# Patient Record
Sex: FEMALE | Race: WHITE | Employment: OTHER | URBAN - NONMETROPOLITAN AREA
[De-identification: names, ages, dates, MRNs, and addresses within clinical notes are randomized per-mention and may not be internally consistent; named-entity substitution may affect disease eponyms.]

---

## 2018-10-17 ENCOUNTER — HOSPITAL ENCOUNTER (OUTPATIENT)
Age: 73
Discharge: HOME | End: 2018-10-17
Payer: MEDICARE

## 2018-10-17 LAB
C-REACTIVE PROTEIN: 0.4 MG/L (ref 0–5)
HCT VFR BLD CALC: 43.1 % (ref 36.3–47.1)
HEMOGLOBIN: 13.9 G/DL (ref 11.9–15.1)
MCH RBC QN AUTO: 29 PG (ref 25.2–33.5)
MCHC RBC AUTO-ENTMCNC: 32.3 G/DL (ref 28.4–34.8)
MCV RBC AUTO: 90 FL (ref 82.6–102.9)
NRBC AUTOMATED: 0 PER 100 WBC
PDW BLD-RTO: 12.1 % (ref 11.8–14.4)
PLATELET # BLD: 272 K/UL (ref 138–453)
PMV BLD AUTO: 9.9 FL (ref 8.1–13.5)
RBC # BLD: 4.79 M/UL (ref 3.95–5.11)
SEDIMENTATION RATE, ERYTHROCYTE: 14 MM (ref 0–20)
WBC # BLD: 9.2 K/UL (ref 3.5–11.3)

## 2018-10-17 PROCEDURE — 85651 RBC SED RATE NONAUTOMATED: CPT

## 2018-10-17 PROCEDURE — 85027 COMPLETE CBC AUTOMATED: CPT

## 2018-10-17 PROCEDURE — 86140 C-REACTIVE PROTEIN: CPT

## 2018-10-17 PROCEDURE — 36415 COLL VENOUS BLD VENIPUNCTURE: CPT

## 2019-05-16 ENCOUNTER — HOSPITAL ENCOUNTER (OUTPATIENT)
Dept: PHYSICAL THERAPY | Age: 74
Setting detail: THERAPIES SERIES
Discharge: HOME | End: 2019-05-16
Payer: MEDICARE

## 2019-05-16 PROCEDURE — 97110 THERAPEUTIC EXERCISES: CPT

## 2019-05-16 PROCEDURE — 97162 PT EVAL MOD COMPLEX 30 MIN: CPT

## 2019-05-16 NOTE — PROGRESS NOTES
Phone: [address]          Fax: [phone]                      Outpatient Physical Therapy                                                                      Evaluation  Date: 2019  Patient: Zulma Templeton  : 1945  [facility] #: [id number]  Referring Practitioner: Dr. Flora Allen DO    Referral Date : 19     Diagnosis: R knee tendinitis    Treatment Diagnosis: Difficulty walking; R knee pain and arthritis; radicular symptoms  Onset Date: 14  PT Insurance Information: Medicare/United healthcare  Total # of Visits Approved: 12   Total # of Visits to Date: 1  No Show: 0  Canceled Appointment: 0     Subjective  Subjective: Pt. reports her last cortisone shot in the R knee was 1 week ago and did not help at all with her knee; she has arthritis in the R knee that was seen on xray about a year ago. Pt. reports she has never had pain in her knee before. Pt. reports tailbone pain and L hip pain as well but not as bad as the knee. Pt. reports R knee pain radiates up into her groin on the front of the thigh. Pt. reports she has difficulty planting flowers and has to sit on a pillow. Pt. reports R knee pain is 9/10 and 4/10 with sitting. Pt. reports Tylenol and ice and heat packs helps a little with the pain but not much. Pt. describes the pain as sudden, sharp and shooting in the L hip and R knee at the same time and has muscle cramping as well.    Additional Pertinent Hx: HTN, CAD, diabetes, anxiety, depression, arthritis, vision problems; lumbar herniated disc, xray one year ago revealed R knee arthritis, cortisone shots R knee             Objective     Observation/Palpation  Posture: Fair  Palpation: Pt. is TTP R anterior knee and thigh and L posterior hip and L PSIS  Observation: Pt. stands with increased lumbar lordosis and decreased thoracic kyphosis with decreased WB'ing on R LE; Pt. amb with decreased WB'ing on R LE due to pain    RLE General PROM: Hip IR: 10*  RLE General AROM: Knee: 0-124*  LLE General PROM: Hip IR: 10*  LLE General AROM: Knee: 0-119* with pain    Spine  Lumbar: flexion: able to reach knee joints; B SB: 6 inches from knee joint lines with pain           Strength RLE  Comment: hip flex/abd: 4-/5; knee ext/flex: 3+/5, ankle DF: 3/5  Strength LLE  Comment: hip flex/abd: 4-/5; knee ext/flex: 4-/5, ankle DF: 4-/5  Strength Other  Other: Abdominal strength: 3+/5    Additional Measures  Special Tests: (+) R slump; relief of B LE radicular pain with lumbar traction over therapy ball in supine    Functional Outcome Measures              Any of your usual work, housework, or school activities: Moderate Difficulty  Your usual hobbies, recreational, or sporting activities: Quite a Bit of Difficulty  Getting into or out of the bath: Quite a Bit of Difficulty  Walking between rooms: A Little Bit of Difficulty  Putting on your shoes or socks: A Little Bit of Difficulty  Squatting: Extreme Difficulty or Unable to Perform Activity  Lifting an object, like a bag of groceries from the floor: Quite a Bit of Difficulty  Performing light activities around your home: Moderate Difficulty  Performing heavy activities around your home: Quite a Bit of Difficulty  Getting into or out of a car:  A Little Bit of Difficulty  Walking 2 blocks: Quite a Bit of Difficulty  Walking a mile: Extreme Difficulty or Unable to Perform Activity  Going up or down 10 stairs (about 1 flight of stairs): Quite a Bit of Difficulty  Standing for 1 hour: Extreme Difficulty or Unable to Perform Activity  Sitting for 1 hour: Moderate Difficulty  Running on even ground : Extreme Difficulty or Unable to Perform Activity  Running on uneven ground : Extreme Difficulty or Unable to Perform Activity  Making sharp turns while running fast : Extreme Difficulty or Unable to Perform Activity  Hopping: Extreme Difficulty or Unable to Perform Activity  Rolling over in bed: Moderate Difficulty  LEFS Total Score: 23      Assessment  Assessment: Pt. is 68year old female with dx of R knee tendonitis who presents with increased pain 9/10 at worst that is sudden, sharp and shooting with standing and walking and is also present in her L hip and tailbone. Pain at rest in R knee is 4/10 on average and pain medication and ice/heat packs do not help with the pain. Pt. stands with increased lumbar lordosis and decreased thoracic kyphosis with decreased [address] on R LE; Pt. amb with decreased [address] on R LE due to pain. Pt. is TTP R anterior knee and thigh and L posterior hip and L PSIS. Pt. has decreased R knee AROM: 0-119* with pain. Pt. has decreased lumbar AROM: flexion: able to reach knee joints; B SB: 6 inches from knee joint lines with pain. Pt. has decreased strength in B LE, R worse than L. R LE strength: hip flex/abd: 4-/5; knee ext/flex: 3+/5, ankle DF: 3/5. (+) R slump; relief of B LE radicular pain with lumbar traction over therapy ball in supine. Pt. to benefit from aquatic therapy to promote offloading of joints to decrease pain with standing and walking and improve B LE and core strength to improve functional mobility and QOL. Prognosis: Good        Decision Making: Medium Complexity    Patient Education  Patient Education: PT eval, POC, HEP  Pt verbalized/demonstrated good understanding:     [X] Yes         [] No, pt required further clarification. Goals  Short term goals  Time Frame for Short term goals: 3 weeks  Short term goal 1: Pt. to initiate HEP to decrease pain and improve ROM and abdominal strength. Short term goal 2: Pt. to intiate aquatic therapy to strengthen B LE's and decreased R knee pain and radicular symptoms  Short term goal 3: Pt. to report decrease in pain with walking and standing to </=6/10 for improved QOL. Long term goals  Time Frame for Long term goals : 6 weeks  Long term goal 1: Pt. to be independent with aquatic exercise program and HEP.   Long term goal 2: Pt. to demo

## 2019-05-16 NOTE — PLAN OF CARE
[facility]           Phone: [phone]             Outpatient Physical Therapy  Fax: [phone]                                           Date: 2019  Patient: Mony Stuart : 1945 CSN #: [id number]   Referring Practitioner:  Dr. Lauro Bess DO Referral Date:  19       [] Plan of Care   [] Updated Plan of Care    Dates of Service to Include: 2019 to 19    Diagnosis:  R knee tendinitis    Rehab (Treatment) Diagnosis:  Difficulty walking; R knee pain and arthritis; radicular symptoms             Onset Date:  14    Attendance  Total # of Visits to Date: 1 No Show: 0 Canceled Appointment: 0    Assessment  Assessment: Pt. is 68year old female with dx of R knee tendonitis who presents with increased pain 9/10 at worst that is sudden, sharp and shooting with standing and walking and is also present in her L hip and tailbone. Pain at rest in R knee is 4/10 on average and pain medication and ice/heat packs do not help with the pain. Pt. stands with increased lumbar lordosis and decreased thoracic kyphosis with decreased [address] on R LE; Pt. amb with decreased [address] on R LE due to pain. Pt. is TTP R anterior knee and thigh and L posterior hip and L PSIS. Pt. has decreased R knee AROM: 0-119* with pain. Pt. has decreased lumbar AROM: flexion: able to reach knee joints; B SB: 6 inches from knee joint lines with pain. Pt. has decreased strength in B LE, R worse than L. R LE strength: hip flex/abd: 4-/5; knee ext/flex: 3+/5, ankle DF: 3/5. (+) R slump; relief of B LE radicular pain with lumbar traction over therapy ball in supine. Pt. to benefit from aquatic therapy to promote offloading of joints to decrease pain with standing and walking and improve B LE and core strength to improve functional mobility and QOL.        Goals  Short term goals  Time Frame for Short term goals: 3 weeks  Short term goal 1: Pt. to initiate HEP to decrease pain and improve ROM and abdominal strength. Short term goal 2: Pt. to intiate aquatic therapy to strengthen B LE's and decreased R knee pain and radicular symptoms  Short term goal 3: Pt. to report decrease in pain with walking and standing to </=6/10 for improved QOL. Long term goals  Time Frame for Long term goals : 6 weeks  Long term goal 1: Pt. to be independent with aquatic exercise program and HEP. Long term goal 2: Pt. to demo improved core strength >/=4/5 and B LE strength to >/=4/5 at all major joints grossly per MMT to improve functional strength. Long term goal 3: Pt. to have improved R knee AROM 0-125* with no pain and have improved lumbar AROM flexion: 4inches from toes to improve functional mobility. Long term goal 4: Pt. to report no radicular symptoms and decreased R knee and L hip pain to </=3/10 for improved QOL.       Prognosis  Prognosis: Good    Treatment Plan   Times per week: 3  Plan weeks: 4-6  [x] HP/CP      [] Electrical Stim   [x] Therapeutic Exercise      [] Gait Training  [x] Aquatics   [] Ultrasound         [x] Patient Education/HEP   [x] Manual Therapy  [] Traction    [x] Neuro-laci        [x] Soft Tissue Mobs            [] Home TENS  [] Iontophoresis    [] Orthotic casting/fitting      [] Dry Needling             Electronically signed by: Easton Fletcher PT, DPT    Date: 5/16/2019      ______________________________________ Date: 5/16/2019   Physician Signature

## 2019-05-17 ENCOUNTER — HOSPITAL ENCOUNTER (OUTPATIENT)
Dept: PHYSICAL THERAPY | Age: 74
Setting detail: THERAPIES SERIES
Discharge: HOME | End: 2019-05-17
Payer: MEDICARE

## 2019-05-17 PROCEDURE — 97113 AQUATIC THERAPY/EXERCISES: CPT

## 2019-05-17 NOTE — PROGRESS NOTES
Phone: Darius           Fax: [phone]                           Outpatient Physical Therapy                                                                            Daily Note    Patient: Rodrigo Michel : 1945  CSN #: [id number]   Referring Practitioner:  Dr. Patricia Cunha DO    Referral Date : 19     Date: 2019    Diagnosis: R knee tendinitis  Treatment Diagnosis: Difficulty walking; R knee pain and arthritis; radicular symptoms    Onset Date: 14  PT Insurance Information: Medicare/United healthcare  Total # of Visits Approved: 12 Per Physician Order  Total # of Visits to Date: 2  No Show: 0  Canceled Appointment: 0      Pre-Treatment Pain:  9/10  Subjective: Pt reports 9/10 R knee pain upon arrival.  Pt expressed excitement of beginning aquatic therapy. Exercises:  Exercise 1: HEP: LTR, SKTC, DKTC, supine PPT's, bridges  Exercise 2: FWD, Retro and lateral walking x2 laps each. Exercise 3: Sink/bench exercises, FWD/LSU/SD x10 ea - Deeper water  Exercise 4: Ant lunges with pink board , side lunges, blue foam push down x10 ea  Exercise 5: Step stretch and wall slide 5 x 10\" hold ea  Exercise 6: Biking/hanging in well x5 min ea      Assessment  Assessment: Pt having fair tolerance to initiation of aquatic exercise program with no c/o of increased pain. Core exercises also added for improved functional mobility- per pt goals. Lumbar flexion AROM = 6 in from toes today. Patient Education  Patient Education: Aquatic exercise program  Pt verbalized/demonstrated good understanding:     [x] Yes         [] No, pt required further clarification. Post Treatment Pain:  6/10      Plan  Times per week: 3  Plan weeks: 4-6      Goals  (Total # of Visits to Date: 2)   Short Term Goals - Time Frame for Short term goals: 3 weeks     Short term goal 1: Pt. to initiate HEP to decrease pain and improve ROM and abdominal strength. -MET [x]Met   []Partially met  []Not met   Short term goal 2: Pt. to intiate aquatic therapy to strengthen B LE's and decreased R knee pain and radicular symptoms-MET  [x]Met   []Partially met  []Not met   Short term goal 3: Pt. to report decrease in pain with walking and standing to </=6/10 for improved QOL. []Met   []Partially met  [x]Not met      []Met   []Partially met  []Not met     Long Term Goals - Time Frame for Long term goals : 6 weeks  Long term goal 1: Pt. to be independent with aquatic exercise program and HEP. []Met  []Partially met  [x]Not met   Long term goal 2: Pt. to demo improved core strength >/=4/5 and B LE strength to >/=4/5 at all major joints grossly per MMT to improve functional strength. []Met  []Partially met  [x]Not met   Long term goal 3: Pt. to have improved R knee AROM 0-125* with no pain and have improved lumbar AROM flexion: 4inches from toes to improve functional mobility. []Met  []Partially met  [x]Not met   Long term goal 4: Pt. to report no radicular symptoms and decreased R knee and L hip pain to </=3/10 for improved QOL.   []Met  []Partially met  [x]Not met     []Met  []Partially met  []Not met       Minutes Tracking:  Time In: 0912  Time Out: 1000  Minutes: Lucas Tadeo PTA     Date: 5/17/2019

## 2019-05-20 ENCOUNTER — HOSPITAL ENCOUNTER (OUTPATIENT)
Dept: PHYSICAL THERAPY | Age: 74
Setting detail: THERAPIES SERIES
Discharge: HOME | End: 2019-05-20
Payer: MEDICARE

## 2019-05-20 PROCEDURE — 97113 AQUATIC THERAPY/EXERCISES: CPT

## 2019-05-20 NOTE — PROGRESS NOTES
Phone: Darius           Fax: [phone]                           Outpatient Physical Therapy                                                                            Daily Note    Patient: Baron Dan : 1945  CSN #: [id number]   Referring Practitioner:  Dr. Clay Phoenix, DO    Referral Date : 19     Date: 2019    Diagnosis: R knee tendinitis  Treatment Diagnosis: Difficulty walking; R knee pain and arthritis; radicular symptoms    Onset Date: 14  PT Insurance Information: Medicare/United healthcare  Total # of Visits Approved: 12 Per Physician Order  Total # of Visits to Date: 3  No Show: 0  Canceled Appointment: 0      Pre-Treatment Pain:  9/10  Subjective: Pt reports 9/10 R knee pain upon arrival.  Pt reports doing a lot of walking this past weekend and had noted increased soreness today. Pt reports not doing exercises for two days d/t \"not having enough time\" but did them yesteday and \"could really feel the front of my thigh working. \"     Exercises:  Exercise 1: HEP: LTR, SKTC, DKTC, supine PPT's, bridges. Updated: seated heel/toe raises, marches and LAQ. Exercise 2: FWD, Retro and lateral walking x2 laps each. - deeper water today d/t increased pain. Exercise 3: Sink/bench exercises, FWD/LSU/SD x10 ea - Deeper water  Exercise 4: Ant lunges with pink board , side lunges, blue foam push down x10 ea  Exercise 5: Step stretch and wall slide 5 x 10\" hold ea  Exercise 6: Biking/hanging in well x5 min ea  Exercise 7: Jet massage x5 min       Assessment  Assessment: Pt completed water walking in deeper water today d/t reports of increased discomfort in shallow water. Pt with no reports of increased pain post tx. Pt arrived to tx with R quad soreness from HEP and \"having a busy weekend. \"  Updated HEP with added exercises of H/T raises, marches and LAQ to increase BLE strength per pt goals.   Pt used jet massage at end of tx d/t reports of low back discomfort. Patient Education  Patient Education: Updated HEP. Pt verbalized/demonstrated good understanding:     [x] Yes         [] No, pt required further clarification. Post Treatment Pain:  8/10      Plan  Times per week: 3  Plan weeks: 4-6      Goals  (Total # of Visits to Date: 3)   Short Term Goals - Time Frame for Short term goals: 3 weeks    Short term goal 1: Pt. to initiate HEP to decrease pain and improve ROM and abdominal strength. -MET                                        [x]Met   []Partially met  []Not met   Short term goal 2: Pt. to intiate aquatic therapy to strengthen B LE's and decreased R knee pain and radicular symptoms-MET  [x]Met   []Partially met  []Not met   Short term goal 3: Pt. to report decrease in pain with walking and standing to </=6/10 for improved QOL. []Met   []Partially met  [x]Not met      []Met   []Partially met  []Not met     Long Term Goals - Time Frame for Long term goals : 6 weeks  Long term goal 1: Pt. to be independent with aquatic exercise program and HEP. []Met  []Partially met  [x]Not met   Long term goal 2: Pt. to demo improved core strength >/=4/5 and B LE strength to >/=4/5 at all major joints grossly per MMT to improve functional strength. []Met  []Partially met  [x]Not met   Long term goal 3: Pt. to have improved R knee AROM 0-125* with no pain and have improved lumbar AROM flexion: 4inches from toes to improve functional mobility. []Met  []Partially met  [x]Not met   Long term goal 4: Pt. to report no radicular symptoms and decreased R knee and L hip pain to </=3/10 for improved QOL.   []Met  []Partially met  [x]Not met     []Met  []Partially met  []Not met       Minutes Tracking:  Time In: [facility]  [facility]  Minutes: [address] [facility], [facility]     Date: 5/20/2019

## 2019-05-22 ENCOUNTER — HOSPITAL ENCOUNTER (OUTPATIENT)
Dept: PHYSICAL THERAPY | Age: 74
Setting detail: THERAPIES SERIES
Discharge: HOME | End: 2019-05-22
Payer: MEDICARE

## 2019-05-22 PROCEDURE — 97113 AQUATIC THERAPY/EXERCISES: CPT

## 2019-05-22 NOTE — PROGRESS NOTES
Phone: [facility]           Fax: [phone]                           Outpatient Physical Therapy                                                                            Daily Note    Patient: Junie Hung : 1945  CSN #: [id number]   Referring Practitioner:  Dr. Kyra Rubio DO    Referral Date : 19     Date: 2019    Diagnosis: R knee tendinitis  Treatment Diagnosis: Difficulty walking; R knee pain and arthritis; radicular symptoms    Onset Date: 14  PT Insurance Information: Medicare/United healthcare  Total # of Visits Approved: 12 Per Physician Order  Total # of Visits to Date: 4  No Show: 0  Canceled Appointment: 0      Pre-Treatment Pain:  9/10  Subjective: Pt arrived to session rating pain at 9/10 in R knee. Pt states that she can feel increased tightness throughout R quad today that she believes is \"from the weather. \"    Exercises:  Exercise 1: HEP: LTR, SKTC, DKTC, supine PPT's, bridges. Updated: seated heel/toe raises, marches and LAQ. Exercise 2: Water walking x2 laps ea: Fwd/lateral/retro  Exercise 3: Sink ex/bench exercises 15x ea , FWD/LSU/SD x10 ea - Deeper water  Exercise 4: Ant lunges with pink board , side lunges, blue foam push down x10 ea  Exercise 5: Step stretch and wall slide 5x 10\" hold ea  Exercise 6: Biking/hanging in well x5 min ea  Exercise 7: Jet massage x5 min    Assessment  Assessment: Pt able to tolerate water walking in shallow end this date without complaints of increased pain. Pt overall showing good tolerance to aquatic therex program, jet massage and bench ex performed simultaneously to improve R knee strength and LB discomfort. Will continue to progress as tolerated. Patient Education  Pt educated on exercise rationale and HEP - good compliance noted. Pt verbalized/demonstrated good understanding:     [x] Yes         [] No, pt required further clarification.     Post Treatment Pain: 8/10      Plan  Times per week: 3  Plan weeks: 4-6      Goals  (Total # of Visits to Date: 4)   Short Term Goals - Time Frame for Short term goals: 3 weeks    Short term goal 1: Pt. to initiate HEP to decrease pain and improve ROM and abdominal strength. -MET                                        [x]Met  []Partially met  []Not met   Short term goal 2: Pt. to intiate aquatic therapy to strengthen B LE's and decreased R knee pain and radicular symptoms -MET  [x]Met  []Partially met  []Not met   Short term goal 3: Pt. to report decrease in pain with walking and standing to </=6/10 for improved QOL. []Met  []Partially met  []Not met      []Met  []Partially met  []Not met     Long Term Goals - Time Frame for Long term goals : 6 weeks  Long term goal 1: Pt. to be independent with aquatic exercise program and HEP. []Met  []Partially met  []Not met   Long term goal 2: Pt. to demo improved core strength >/=4/5 and B LE strength to >/=4/5 at all major joints grossly per MMT to improve functional strength. []Met  []Partially met  []Not met   Long term goal 3: Pt. to have improved R knee AROM 0-125* with no pain and have improved lumbar AROM flexion: 4inches from toes to improve functional mobility. []Met  []Partially met  []Not met   Long term goal 4: Pt. to report no radicular symptoms and decreased R knee and L hip pain to </=3/10 for improved QOL.   []Met  []Partially met  []Not met     []Met  []Partially met  []Not met       Minutes Tracking:  Time In: Tata Ortiz  Time Out: [address]  Minutes: [address] [facility], PTA       Date: 5/22/2019

## 2019-05-24 ENCOUNTER — HOSPITAL ENCOUNTER (OUTPATIENT)
Dept: PHYSICAL THERAPY | Age: 74
Setting detail: THERAPIES SERIES
Discharge: HOME | End: 2019-05-24
Payer: MEDICARE

## 2019-05-24 PROCEDURE — 97113 AQUATIC THERAPY/EXERCISES: CPT

## 2019-05-24 NOTE — PROGRESS NOTES
Phone: [facility]arius           Fax: [phone]                           Outpatient Physical Therapy                                                                            Daily Note    Patient: Cricket Sagastume : 1945  [facility] #: [id number]   Referring Practitioner:  Dr. Pascale Cope DO    Referral Date : 19     Date: 2019    Diagnosis: R knee tendinitis  Treatment Diagnosis: Difficulty walking; R knee pain and arthritis; radicular symptoms    Onset Date: 14  PT Insurance Information: Medicare/United healthcare  Total # of Visits Approved: 12 Per Physician Order  Total # of Visits to Date: 5  No Show: 0  Canceled Appointment: 0      Pre-Treatment Pain:  6/10  Subjective: Pt arrived to session rating R knee pain at a 10. Pt states she had '10/10' R knee pain on Wednesday after leaving her pool therapy session. Exercises:  Exercise 1: HEP: LTR, SKTC, DKTC, supine PPT's, bridges. Updated: seated heel/toe raises, marches and LAQ. Exercise 2: Water walking x2 laps ea: Fwd/lateral/retro  Exercise 3: Sink ex/bench exercises 15x ea , FWD/LSU/SD x10 ea - Deeper water  Exercise 4: Ant lunges with pink board , side lunges, blue foam push down x10 ea  Exercise 5: Step stretch and wall slide 5x 10\" hold ea  Exercise 6: Biking/hanging in well x5 min ea  Exercise 7: Jet massage x5 min- held today    Assessment  Assessment: Pt had no c/o increased pain throughout/post tx today. AROM lumbar flexion= 4 inches from toes meeting LTG #3.  R knee AROM = 0-106* today. Pt had reported increase in pain once reaching 106* of flexion. Pt reports compliance with updated HEP. Patient Education  Patient Education: Exercise rationale. Pt verbalized/demonstrated good understanding:     [x] Yes         [] No, pt required further clarification.     Post Treatment Pain:  6/10      Plan  Times per week: 3  Plan weeks: 4-6      Goals  (Total # of Visits to Date: 5)   Short Term Goals - Time Frame for Short term goals: 3 weeks    Short term goal 1: Pt. to initiate HEP to decrease pain and improve ROM and abdominal strength. -MET                                        [x]Met   []Partially met  []Not met   Short term goal 2: Pt. to intiate aquatic therapy to strengthen B LE's and decreased R knee pain and radicular symptoms -MET  [x]Met   []Partially met  []Not met   Short term goal 3: Pt. to report decrease in pain with walking and standing to </=6/10 for improved QOL. []Met   []Partially met  [x]Not met      []Met   []Partially met  []Not met     Long Term Goals - Time Frame for Long term goals : 6 weeks  Long term goal 1: Pt. to be independent with aquatic exercise program and HEP. [x]Met  []Partially met  []Not met   Long term goal 2: Pt. to demo improved core strength >/=4/5 and B LE strength to >/=4/5 at all major joints grossly per MMT to improve functional strength. []Met  []Partially met  [x]Not met   Long term goal 3: Pt. to have improved R knee AROM 0-125* with no pain and have improved lumbar AROM flexion: 4inches from toes to improve functional mobility. (Met lumbar AROM goal= 4in from toes as of 5/24/19) []Met  [x]Partially met  []Not met   Long term goal 4: Pt. to report no radicular symptoms and decreased R knee and L hip pain to </=3/10 for improved QOL.   []Met  []Partially met  [x]Not met     []Met  []Partially met  []Not met       Minutes Tracking:  Time In: 0827  Time Out: 0911  Minutes: [address], [facility]     Date: 5/24/2019

## 2019-05-29 ENCOUNTER — HOSPITAL ENCOUNTER (OUTPATIENT)
Dept: PHYSICAL THERAPY | Age: 74
Setting detail: THERAPIES SERIES
End: 2019-05-29
Payer: MEDICARE

## 2019-05-31 ENCOUNTER — HOSPITAL ENCOUNTER (OUTPATIENT)
Dept: PHYSICAL THERAPY | Age: 74
Setting detail: THERAPIES SERIES
Discharge: HOME | End: 2019-05-31
Payer: MEDICARE

## 2019-05-31 PROCEDURE — 97113 AQUATIC THERAPY/EXERCISES: CPT

## 2019-05-31 NOTE — PROGRESS NOTES
Phone: Darius           Fax: [phone]                           Outpatient Physical Therapy                                                                            Daily Note    Patient: Carly Page : 1945  [facility] #: [id number]   Referring Practitioner:  Dr. Emily Garcia DO    Referral Date : 19     Date: 2019    Diagnosis: R knee tendinitis  Treatment Diagnosis: Difficulty walking; R knee pain and arthritis; radicular symptoms    Onset Date: 14  PT Insurance Information: Medicare/United healthcare  Total # of Visits Approved: 12 Per Physician Order  Total # of Visits to Date: 6  No Show: 0  Canceled Appointment: 0      Pre-Treatment Pain:  10/10  Subjective: Today, pt reports R knee pain feels like a '10/10', with pt reporting she was 'up and down stairs a lot of times since Wednesday\". Pt reports she is getting ready for a garage sale this weekend. Pt reports she does take breaks and ices her knee as needed. Reports she does get swelling in knee. Exercises:  Exercise 1: HEP: LTR, SKTC, DKTC, supine PPT's, bridges. Updated: seated heel/toe raises, marches and LAQ. Exercise 2: Water walking x2 laps ea: Fwd/lateral/retro- deeper water today  Exercise 3: Sink ex/bench exercises 15x ea , FWD/LSU/SD x10 ea - Deeper water  Exercise 4: Ant lunges with pink board , side lunges, blue foam push down x10 ea  Exercise 5: Step stretch and wall slide 5x 10\" hold ea  Exercise 6: Biking/hanging in well x5 min ea  Exercise 7: Jet massage x5 min- held today      Assessment  Assessment: Pt completed most of tx in deeper today d/t reports of R knee pain at almost a '10/10'. Pt encouraged to take longer and frequent RBs when at home as well as applying a CP to help decrease pain/swelling. R knee ROM= 1-119* today. MMT:  Core= 4/5. BLE strength grossly 4-/5.       Patient Education  Patient Education: Importance of RBs and using ice for pain relief. Pt verbalized/demonstrated good understanding:     [x] Yes         [] No, pt required further clarification. Post Treatment Pain:  5/10      Plan  Times per week: 3  Plan weeks: 4-6      Goals  (Total # of Visits to Date: 6)   Short Term Goals - Time Frame for Short term goals: 3 weeks   Short term goal 1: Pt. to initiate HEP to decrease pain and improve ROM and abdominal strength. -MET                                        [x]Met   []Partially met  []Not met   Short term goal 2: Pt. to intiate aquatic therapy to strengthen B LE's and decreased R knee pain and radicular symptoms -MET  [x]Met   []Partially met  []Not met   Short term goal 3: Pt. to report decrease in pain with walking and standing to </=6/10 for improved QOL. []Met   []Partially met  []Not met      []Met   []Partially met  []Not met     Long Term Goals - Time Frame for Long term goals : 6 weeks  Long term goal 1: Pt. to be independent with aquatic exercise program and HEP. []Met  [x]Partially met  []Not met   Long term goal 2: Pt. to demo improved core strength >/=4/5 and B LE strength to >/=4/5 at all major joints grossly per MMT to improve functional strength. (Met core MMT 4/5 as of 5/31/19) []Met  [x]Partially met  []Not met   Long term goal 3: Pt. to have improved R knee AROM 0-125* with no pain and have improved lumbar AROM flexion: 4inches from toes to improve functional mobility. (Met lumbar AROM goal= 4in from toes as of 5/24/19) []Met  [x]Partially met  []Not met   Long term goal 4: Pt. to report no radicular symptoms and decreased R knee and L hip pain to </=3/10 for improved QOL.   []Met  []Partially met  [x]Not met     []Met  []Partially met  []Not met       Minutes Tracking:  Time In: 6024  Time Out: 0845  Minutes: [address] [facility], [facility]     Date: 5/31/2019

## 2019-06-03 ENCOUNTER — HOSPITAL ENCOUNTER (OUTPATIENT)
Dept: PHYSICAL THERAPY | Age: 74
Setting detail: THERAPIES SERIES
Discharge: HOME | End: 2019-06-03
Payer: MEDICARE

## 2019-06-03 PROCEDURE — 97113 AQUATIC THERAPY/EXERCISES: CPT

## 2019-06-03 NOTE — PROGRESS NOTES
Pain:  0/10      Plan  Times per week: 3  Plan weeks: 4-6      Goals  (Total # of Visits to Date: 7)   Short Term Goals - Time Frame for Short term goals: 3 weeks    Short term goal 1: Pt. to initiate HEP to decrease pain and improve ROM and abdominal strength. -MET                                        [x]Met   []Partially met  []Not met   Short term goal 2: Pt. to intiate aquatic therapy to strengthen B LE's and decreased R knee pain and radicular symptoms -MET  [x]Met   []Partially met  []Not met   Short term goal 3: Pt. to report decrease in pain with walking and standing to </=6/10 for improved QOL. []Met   [x]Partially met  []Not met      []Met   []Partially met  []Not met     Long Term Goals - Time Frame for Long term goals : 6 weeks  Long term goal 1: Pt. to be independent with aquatic exercise program and HEP. [x]Met  []Partially met  []Not met   Long term goal 2: Pt. to demo improved core strength >/=4/5 and B LE strength to >/=4/5 at all major joints grossly per MMT to improve functional strength. (Met core MMT 4/5 as of 5/31/19) [x]Met  []Partially met  []Not met   Long term goal 3: Pt. to have improved R knee AROM 0-125* with no pain and have improved lumbar AROM flexion: 4inches from toes to improve functional mobility. MET BOTH (0-126* as of 6/3/19) [x]Met  []Partially met  []Not met   Long term goal 4: Pt. to report no radicular symptoms and decreased R knee and L hip pain to </=3/10 for improved QOL.   []Met  [x]Partially met  []Not met     []Met  []Partially met  []Not met       Minutes Tracking:  Time In: 2402  Time Out: Velia 621  Minutes: Lucas Tadeo PTA     Date: 6/3/2019

## 2019-06-05 ENCOUNTER — HOSPITAL ENCOUNTER (OUTPATIENT)
Dept: PHYSICAL THERAPY | Age: 74
Setting detail: THERAPIES SERIES
Discharge: HOME | End: 2019-06-05
Payer: MEDICARE

## 2019-06-05 PROCEDURE — 97113 AQUATIC THERAPY/EXERCISES: CPT

## 2019-06-05 NOTE — PROGRESS NOTES
Phone: Darius           Fax: [phone]                           Outpatient Physical Therapy                                                                            Daily Note    Patient: Baron Dan : 1945  [facility] #: [id number]   Referring Practitioner:  Dr. Clay Phoenix, DO    Referral Date : 19     Date: 2019    Diagnosis: R knee tendinitis  Treatment Diagnosis: Difficulty walking; R knee pain and arthritis; radicular symptoms    Onset Date: 14  PT Insurance Information: Medicare/United healthcare  Total # of Visits Approved: 12 Per Physician Order  Total # of Visits to Date: 8  No Show: 0  Canceled Appointment: 0      Pre-Treatment Pain:  10/10  Subjective: Pt reports she slipped on steps at home and slid into wall yesterday morning () w/ pain rating at 10/10 in R knee today. Exercises:  Exercise 1: HEP: LTR, SKTC, DKTC, supine PPT's, bridges. Updated: seated heel/toe raises, marches and LAQ. Exercise 2: Water walking x2 laps ea: Fwd/lateral/retro  Exercise 3: Sink ex/bench exercises 15x ea , FWD/LSU/SD x10 ea  Exercise 4: Ant lunges with pink board , side lunges, blue foam push down x10 ea -HELD BLUE FLOAT PUSH DOWNS  Exercise 5: Step stretch and wall slide 10x 5\" hold ea  Exercise 6: Biking/hanging in well x5 min ea  Exercise 7: Jet massage x5 min    Assessment  Assessment: Certain exercises performed in deep water this date d/t increased R knee pain coming into session, pt having fair tolerance. Held blue float push downs to avoid increasing overall pain level. Will continue to progress as able. Patient Education  Pt educated on exercise rationale. Pt verbalized/demonstrated good understanding:     [x] Yes         [] No, pt required further clarification.     Post Treatment Pain:  9-10/10      Plan  Times per week: 3  Plan weeks: 4-6      Goals  (Total # of Visits to Date: 8)   Short Term Goals - Time Frame for Short term goals: 3 weeks    Short term goal 1: Pt. to initiate HEP to decrease pain and improve ROM and abdominal strength. -MET                                        [x]Met  []Partially met  []Not met   Short term goal 2: Pt. to intiate aquatic therapy to strengthen B LE's and decreased R knee pain and radicular symptoms -MET  [x]Met   []Partially met  []Not met   Short term goal 3: Pt. to report decrease in pain with walking and standing to </=6/10 for improved QOL. []Met  []Partially met  []Not met      []Met  []Partially met  []Not met     Long Term Goals - Time Frame for Long term goals : 6 weeks  Long term goal 1: Pt. to be independent with aquatic exercise program and HEP. -MET [x]Met  []Partially met  []Not met   Long term goal 2: Pt. to demo improved core strength >/=4/5 and B LE strength to >/=4/5 at all major joints grossly per MMT to improve functional strength. -PROGRESSING (core=4/5) []Met  [x]Partially met  []Not met   Long term goal 3: Pt. to have improved R knee AROM 0-125* with no pain and have improved lumbar AROM flexion: 4 inches from toes to improve functional mobility. -MET (0-126*) [x]Met  []Partially met  []Not met   Long term goal 4: Pt. to report no radicular symptoms and decreased R knee and L hip pain to </=3/10 for improved QOL.   []Met  []Partially met  []Not met     []Met  []Partially met  []Not met       Minutes Tracking:  Time In: 3210  Time Out: 0901  Minutes: [address] [facility] [address], PTA       Date: 6/5/2019

## 2019-06-07 ENCOUNTER — HOSPITAL ENCOUNTER (OUTPATIENT)
Dept: PHYSICAL THERAPY | Age: 74
Setting detail: THERAPIES SERIES
Discharge: HOME | End: 2019-06-07
Payer: MEDICARE

## 2019-06-07 PROCEDURE — 97113 AQUATIC THERAPY/EXERCISES: CPT

## 2019-06-07 ASSESSMENT — PAIN SCALES - GENERAL: PAINLEVEL_OUTOF10: 9

## 2019-06-07 ASSESSMENT — PAIN DESCRIPTION - DESCRIPTORS: DESCRIPTORS: THROBBING

## 2019-06-07 ASSESSMENT — PAIN DESCRIPTION - FREQUENCY: FREQUENCY: CONTINUOUS

## 2019-06-07 NOTE — PROGRESS NOTES
Phone: Darius           Fax: [phone]                           Outpatient Physical Therapy                                                                            Daily Note    Patient: Sunshine James : 1945  CSN #: [id number]   Referring Practitioner:  Dr. Karen Hogan DO    Referral Date : 19     Date: 2019    Diagnosis: R knee tendinitis  Treatment Diagnosis: Difficulty walking; R knee pain and arthritis; radicular symptoms    Onset Date: 14  PT Insurance Information: Medicare/United healthcare  Total # of Visits Approved: 12 Per Physician Order  Total # of Visits to Date: 9  No Show: 0  Canceled Appointment: 0      Pre-Treatment Pain:  9/10  Subjective: Pt arrived to tx rating R knee pain a /10. Reports she's still sore from slipping on steps 2 days ago. Pt reports she \"drug 8 tables out into the garage yesterday\" and is getting things around for a garage sale. Exercises:  Exercise 1: HEP: LTR, SKTC, DKTC, supine PPT's, bridges. Updated: seated heel/toe raises, marches and LAQ. Exercise 2: Water walking x2 laps ea: Fwd/lateral/retro  Exercise 3: Sink ex/bench exercises 15x ea , FWD/LSU/SD x10 ea  Exercise 4: Ant lunges with pink board , side lunges, blue foam push down x10 ea -HELD BLUE FLOAT PUSH DOWNS  Exercise 5: Step stretch and wall slide 10x 5\" hold ea  Exercise 6: Biking/hanging in well x5 min ea  Exercise 7: Jet massage x5 min-HELD (pt declined)    Assessment  Assessment: Pt reports pain is still increased today. Reports she's been doing heavy lifting/moving to prepare for garage sale. Pt educated on importance of taking frequent RBs, asking for help and icing knee to allow her knee to adequately heal. Pt reports a significant decrease in pain after tx. Patient Education  Patient Education: Importance of taking RBs/asking for help when at working at home.   Pt verbalized/demonstrated good understanding:     [x] Yes [] No, pt required further clarification. Post Treatment Pain:  2/10      Plan  Times per week: 3  Plan weeks: 4-6      Goals  (Total # of Visits to Date: 5)   Short Term Goals - Time Frame for Short term goals: 3 weeks     Short term goal 1: Pt. to initiate HEP to decrease pain and improve ROM and abdominal strength. -MET                                        []Met   []Partially met  []Not met   Short term goal 2: Pt. to intiate aquatic therapy to strengthen B LE's and decreased R knee pain and radicular symptoms -MET  []Met   []Partially met  []Not met   Short term goal 3: Pt. to report decrease in pain with walking and standing to </=6/10 for improved QOL. []Met   []Partially met  []Not met      []Met   []Partially met  []Not met     Long Term Goals - Time Frame for Long term goals : 6 weeks  Long term goal 1: Pt. to be independent with aquatic exercise program and HEP. -MET []Met  []Partially met  []Not met   Long term goal 2: Pt. to demo improved core strength >/=4/5 and B LE strength to >/=4/5 at all major joints grossly per MMT to improve functional strength. -PROGRESSING (core=4/5) []Met  []Partially met  []Not met   Long term goal 3: Pt. to have improved R knee AROM 0-125* with no pain and have improved lumbar AROM flexion: 4 inches from toes to improve functional mobility. -MET (0-126*) []Met  []Partially met  []Not met   Long term goal 4: Pt. to report no radicular symptoms and decreased R knee and L hip pain to </=3/10 for improved QOL.   []Met  []Partially met  []Not met     []Met  []Partially met  []Not met       Minutes Tracking:  Time In: 6473  Time Out: 0840  Minutes: Phyllis Castellano, PTA     Date: 6/7/2019

## 2019-06-10 ENCOUNTER — HOSPITAL ENCOUNTER (OUTPATIENT)
Dept: PHYSICAL THERAPY | Age: 74
Setting detail: THERAPIES SERIES
Discharge: HOME | End: 2019-06-10
Payer: MEDICARE

## 2019-06-10 PROCEDURE — 97113 AQUATIC THERAPY/EXERCISES: CPT

## 2019-06-10 NOTE — PROGRESS NOTES
Phone: Darius           Fax: [phone]                           Outpatient Physical Therapy                                                                            Daily Note    Patient: Cricket Sagastume : 1945  CSN #: [id number]   Referring Practitioner:  Dr. 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 Pt reports R knee pain is at a 9/10 at the moment. Pt states \"I just don't feel like therapy is really working anymore. \"  Pt reports 'the groin, under my knee cap and my upper thigh pain has went away but now it just hurts in my knee joint. \"      Exercises:  Exercise 1: HEP: LTR, SKTC, DKTC, supine PPT's, bridges. Updated: seated heel/toe raises, marches and LAQ. Exercise 2: Water walking x2 laps ea: Fwd/lateral/retro  Exercise 3: Sink ex/bench exercises 15x ea , FWD/LSU/SD x10 ea  Exercise 4: Ant lunges with pink board , side lunges, blue foam push down x10 ea -HELD BLUE FLOAT PUSH DOWNS  Exercise 5: Step stretch and wall slide 10x 5\" hold ea  Exercise 6: Biking/hanging in well x5 min ea  Exercise 7: Jet massage x5 min-HELD (pt declined)        Assessment  Assessment: Pt continues to c/o increased R knee pain, states \"My pain that I had when I first started seems like it's gone but now I think arthritis is getting to me. I think I'm going to need surgery on it like the  said. \"  Pt reports that she doesn't think therapy is helping much anymore and wants to buy a knee brace to attempt to alleviate pain. Will follow up with PT to discuss pt's POC. R knee AROM= 0-132*. Patient Education  Proper brace for knee  Pt verbalized/demonstrated good understanding:     [x] Yes         [] No, pt required further clarification. Post Treatment Pain:  9/10      Plan  Times per week: 3  Plan weeks: 4-6      Goals  (Total # of Visits to Date: 10)   Short Term Goals - Time Frame for Short term goals: 3 weeks     Short term goal 1: Pt. to initiate HEP to decrease pain and improve ROM and abdominal strength. -MET                                        []Met   []Partially met  []Not met   Short term goal 2: Pt. to intiate aquatic therapy to strengthen B LE's and decreased R knee pain and radicular symptoms -MET  []Met   []Partially met  []Not met   Short term goal 3: Pt. to report decrease in pain with walking and standing to </=6/10 for improved QOL. []Met   []Partially met  []Not met      []Met   []Partially met  []Not met     Long Term Goals - Time Frame for Long term goals : 6 weeks  Long term goal 1: Pt. to be independent with aquatic exercise program and HEP. -MET []Met  []Partially met  []Not met   Long term goal 2: Pt. to demo improved core strength >/=4/5 and B LE strength to >/=4/5 at all major joints grossly per MMT to improve functional strength. -PROGRESSING (core=4/5) []Met  []Partially met  []Not met   Long term goal 3: Pt. to have improved R knee AROM 0-125* with no pain and have improved lumbar AROM flexion: 4 inches from toes to improve functional mobility. -MET (0-132*) []Met  []Partially met  []Not met   Long term goal 4: Pt. to report no radicular symptoms and decreased R knee and L hip pain to </=3/10 for improved QOL.   []Met  []Partially met  []Not met     []Met  []Partially met  []Not met       Minutes Tracking:  Time In: 0800  Time Out: 0848  Minutes: Lucas Tadeo PTA     Date: 6/10/2019

## 2019-06-12 ENCOUNTER — HOSPITAL ENCOUNTER (OUTPATIENT)
Dept: PHYSICAL THERAPY | Age: 74
Setting detail: THERAPIES SERIES
Discharge: HOME | End: 2019-06-12
Payer: MEDICARE

## 2019-06-12 PROCEDURE — 97113 AQUATIC THERAPY/EXERCISES: CPT

## 2019-06-12 NOTE — PROGRESS NOTES
Phone: Darius           Fax: [phone]                           Outpatient Physical Therapy                                                                            Daily Note    Patient: Jessie Watts : 1945  CSN #: [id number]   Referring Practitioner:  Dr. Elizabeth Linton DO    Referral Date : 19     Date: 2019    Diagnosis: R knee tendinitis  Treatment Diagnosis: Difficulty walking; R knee pain and arthritis; radicular symptoms    Onset Date: 14  PT Insurance Information: Medicare/United healthcare  Total # of Visits Approved: 12 Per Physician Order  Total # of Visits to Date: 11  No Show: 0  Canceled Appointment: 0      Pre-Treatment Pain:  4/10  Subjective: Pt arrived to session rating pain at 4/10 in R knee, pt states that, \"the only thing that sucks is that once I get out of the pool, pain comes back. \"    Exercises:  Exercise 1: HEP: LTR, SKTC, DKTC, supine PPT's, bridges. Updated: seated heel/toe raises, marches and LAQ. Exercise 2: Water walking x2 laps ea: Fwd/lateral/retro  Exercise 3: Sink ex/bench exercises 15x ea , FWD/LSU/SD x15 ea  Exercise 4: Ant lunges with pink board , side lunges, blue foam push down x10 ea -HELD BLUE FLOAT PUSH DOWNS  Exercise 5: Step stretch and wall slide 10x 5\" hold ea  Exercise 6: Biking/hanging in well x5 min ea  Exercise 7: Jet massage x5 min    Assessment  Assessment: Pt reports standing and walking for increased distances/amounts of time increases pain to 10/10. Pt reporting improvement in pain level to 2/10 following pool session, prior to getting out of pool. Pt has follow up appt w/  today in [place] in regards to R knee pain. Will continue to progress therex program as tolerated by pt. Patient Education  Pt educated on exercise rationale and goals for therapy. Pt verbalized/demonstrated good understanding:     [x] Yes         [] No, pt required further clarification.     Post Treatment Pain:  2/10      Plan  Times per week: 3  Plan weeks: 4-6      Goals  (Total # of Visits to Date: 6)   Short Term Goals - Time Frame for Short term goals: 3 weeks    Short term goal 1: Pt. to initiate HEP to decrease pain and improve ROM and abdominal strength. -MET                                        [x]Met  []Partially met  []Not met   Short term goal 2: Pt. to intiate aquatic therapy to strengthen B LE's and decreased R knee pain and radicular symptoms -MET  [x]Met  []Partially met  []Not met   Short term goal 3: Pt. to report decrease in pain with walking and standing to </=6/10 for improved QOL. []Met  []Partially met  []Not met      []Met  []Partially met  []Not met     Long Term Goals - Time Frame for Long term goals : 6 weeks  Long term goal 1: Pt. to be independent with aquatic exercise program and HEP. -MET [x]Met  []Partially met  []Not met   Long term goal 2: Pt. to demo improved core strength >/=4/5 and B LE strength to >/=4/5 at all major joints grossly per MMT to improve functional strength. -PROGRESSING (core=4/5) []Met  []Partially met  []Not met   Long term goal 3: Pt. to have improved R knee AROM 0-125* with no pain and have improved lumbar AROM flexion: 4 inches from toes to improve functional mobility. -MET (0-132*) []Met  []Partially met  []Not met   Long term goal 4: Pt. to report no radicular symptoms and decreased R knee and L hip pain to </=3/10 for improved QOL.   []Met  []Partially met  []Not met     []Met  []Partially met  []Not met       Minutes Tracking:  Time In: 0816  Time Out: 0900  Minutes: [address] [facility], [facility]       Date: 6/12/2019

## 2019-06-14 ENCOUNTER — HOSPITAL ENCOUNTER (OUTPATIENT)
Dept: PHYSICAL THERAPY | Age: 74
Setting detail: THERAPIES SERIES
Discharge: HOME | End: 2019-06-14
Payer: MEDICARE

## 2019-06-14 PROCEDURE — 97113 AQUATIC THERAPY/EXERCISES: CPT

## 2019-06-14 NOTE — PROGRESS NOTES
Phone: Darius           Fax: [phone]                           Outpatient Physical Therapy                                                                            Daily Note    Patient: Radha Brizuela : 1945  CSN #: [id number]   Referring Practitioner:  Dr. Donny Macias DO    Referral Date : 19     Date: 2019    Diagnosis: R knee tendinitis  Treatment Diagnosis: Difficulty walking; R knee pain and arthritis; radicular symptoms    Onset Date: 14  PT Insurance Information: Medicare/United healthcare  Total # of Visits Approved: 12 Per Physician Order  Total # of Visits to Date: 12  No Show: 0  Canceled Appointment: 0      Pre-Treatment Pain: 6/10  Subjective: Pt reports 6/10 R knee pain upon arrival.  Pt reports \"I hope it's getting better but it doesnt feel like it. \" Reports pain is there all the time while moving. Pt reports she had a DR visit Wednesday but reports \"the Dr didnt even look at my knee, he was more worried about my foot. \"      Exercises:  Exercise 1: HEP: LTR, SKTC, DKTC, supine PPT's, bridges. Updated: seated heel/toe raises, marches and LAQ. Exercise 2: Water walking x2 laps ea: Fwd/lateral/retro  Exercise 3: Sink ex/bench exercises 15x ea , FWD/LSU/SD x15 ea  Exercise 4: Ant lunges with pink board , side lunges, blue foam push down x10 ea -HELD BLUE FLOAT PUSH DOWNS  Exercise 5: Step stretch and wall slide 10x 5\" hold ea  Exercise 6: Sit to stands 2x10 without BUE use  Exercise 7: Biking/hanging in well x5 min ea  Exercise 8: Jet massage x5 min          Assessment  Assessment: Pt reports that her Dr visit on Wednesday was focused on her foot and states \"he didn't even look at my knee. \"  Pt continues to c/o increased R knee pain with activity, states \"I hope its getting better but it doesn't feel like it. \"  Added sit to stands without UE use this date to increase BLE strength per pt goals.   Will continue to progress per pt tolerance. Pt met LTG #2 today with BLE and core strength =4/5. Patient Education  Exercise rationale  Pt verbalized/demonstrated good understanding:     [x] Yes         [] No, pt required further clarification. Post Treatment Pain:  6/10      Plan  Times per week: 3  Plan weeks: 4-6      Goals  (Total # of Visits to Date: 15)   Short Term Goals - Time Frame for Short term goals: 3 weeks   Short term goal 1: Pt. to initiate HEP to decrease pain and improve ROM and abdominal strength. -MET                                        []Met   []Partially met  []Not met   Short term goal 2: Pt. to intiate aquatic therapy to strengthen B LE's and decreased R knee pain and radicular symptoms -MET  []Met   []Partially met  []Not met   Short term goal 3: Pt. to report decrease in pain with walking and standing to </=6/10 for improved QOL. []Met   []Partially met  []Not met      []Met   []Partially met  []Not met     Long Term Goals - Time Frame for Long term goals : 6 weeks  Long term goal 1: Pt. to be independent with aquatic exercise program and HEP. -MET []Met  []Partially met  []Not met   Long term goal 2: Pt. to demo improved core strength >/=4/5 and B LE strength to >/=4/5 at all major joints grossly per MMT to improve functional strength. -MET []Met  []Partially met  []Not met   Long term goal 3: Pt. to have improved R knee AROM 0-125* with no pain and have improved lumbar AROM flexion: 4 inches from toes to improve functional mobility. -MET (0-132*) []Met  []Partially met  []Not met   Long term goal 4: Pt. to report no radicular symptoms and decreased R knee and L hip pain to </=3/10 for improved QOL.   []Met  []Partially met  []Not met     []Met  []Partially met  []Not met       Minutes Tracking:  Time In: 0800  Time Out: 0845  Minutes: 898 JO ANN Urena PTA     Date: 6/14/2019

## 2019-06-17 ENCOUNTER — HOSPITAL ENCOUNTER (OUTPATIENT)
Dept: PHYSICAL THERAPY | Age: 74
Setting detail: THERAPIES SERIES
Discharge: HOME | End: 2019-06-17
Payer: MEDICARE

## 2019-06-17 PROCEDURE — 97113 AQUATIC THERAPY/EXERCISES: CPT

## 2019-06-17 NOTE — PROGRESS NOTES
Phone: Darius           Fax: [phone]                           Outpatient Physical Therapy                                                                            Daily Note    Patient: Zulma Templeton : 1945  CSN #: [id number]   Referring Practitioner:  Dr. Flora Allen DO    Referral Date : 19     Date: 2019    Diagnosis: R knee tendinitis  Treatment Diagnosis: Difficulty walking; R knee pain and arthritis; radicular symptoms    Onset Date: 14  PT Insurance Information: Medicare/United healthcare  Total # of Visits Approved: 12 Per Physician Order  Total # of Visits to Date: 13  No Show: 0  Canceled Appointment: 0      Pre-Treatment Pain:  2/10  Subjective: Pt arrived to tx reporting R knee pain is \"a lot better than it was\" rating it a 2/10 today. Pt states \"It was really bad yesterday. \"  Today is pt's last tx before re-eval with PT. Pt continues to express frustration from recent Dr visit on . Pt reports \"the doctor didn't even look at my knee. He was more concerned about a small rash on my foot. \"  Pt states she may call around to other doctors to have her knee checked out. Exercises:  Exercise 1: HEP: LTR, SKTC, DKTC, supine PPT's, bridges. Updated: seated heel/toe raises, marches and LAQ. Exercise 2: Water walking x2 laps ea: Fwd/lateral/retro  Exercise 3: Sink ex, FWD/LSU/SD x15 ea  Exercise 4: Ant lunges with pink board , side lunges, blue foam push down x10 ea -HELD BLUE FLOAT PUSH DOWNS  Exercise 5: Step stretch and wall slide 10x 10\" hold ea  Exercise 6: 20 Sit to stands without BUE use  Exercise 7: Biking/hanging in well x5 min ea  Exercise 8: Jet massage x5 min      Assessment  Assessment: As of today, pt has meet STG's 1 and 2 and LTG's 1-3. Pt continues to have flucuations in pain, ranging anywhere from \"a 2 to a 10/10, maybe even higher. \"  Pt reports she takes Tylenol 3 on days where pain is high.   As of today, pt's symptoms/pain she had when initiating aquatic therapy has since deminished -as reported by pt. Pt reports \"it's feeling like it's in my knee joint now. \"      Patient Education  Cues for exercise technique. Pt verbalized/demonstrated good understanding:     [x] Yes         [] No, pt required further clarification. Post Treatment Pain:  3/10      Plan  Times per week: 3  Plan weeks: 4-6      Goals  (Total # of Visits to Date: 15)   Short Term Goals - Time Frame for Short term goals: 3 weeks    Short term goal 1: Pt. to initiate HEP to decrease pain and improve ROM and abdominal strength. -MET                                        []Met  []Partially met  []Not met   Short term goal 2: Pt. to intiate aquatic therapy to strengthen B LE's and decreased R knee pain and radicular symptoms -MET  []Met   []Partially met  []Not met   Short term goal 3: Pt. to report decrease in pain with walking and standing to </=6/10 for improved QOL. []Met   []Partially met  []Not met      []Met   []Partially met  []Not met     Long Term Goals - Time Frame for Long term goals : 6 weeks  Long term goal 1: Pt. to be independent with aquatic exercise program and HEP. -MET []Met  []Partially met  []Not met   Long term goal 2: Pt. to demo improved core strength >/=4/5 and B LE strength to >/=4/5 at all major joints grossly per MMT to improve functional strength. -MET []Met  []Partially met  []Not met   Long term goal 3: Pt. to have improved R knee AROM 0-125* with no pain and have improved lumbar AROM flexion: 4 inches from toes to improve functional mobility. -MET (0-132*) []Met  []Partially met  []Not met   Long term goal 4: Pt. to report no radicular symptoms and decreased R knee and L hip pain to </=3/10 for improved QOL.   []Met  []Partially met  []Not met     []Met  []Partially met  []Not met       Minutes Tracking:  Time In: 0800  Time Out: Luige Tyler 10  Minutes: [address], [facility]     Date: 6/17/2019

## 2019-06-19 ENCOUNTER — HOSPITAL ENCOUNTER (OUTPATIENT)
Dept: PHYSICAL THERAPY | Age: 74
Setting detail: THERAPIES SERIES
Discharge: HOME | End: 2019-06-19
Payer: MEDICARE

## 2019-06-19 PROCEDURE — 97110 THERAPEUTIC EXERCISES: CPT

## 2019-06-19 NOTE — PROGRESS NOTES
Phone: Darius           Fax: [phone]                           Outpatient Physical Therapy                                                                            Daily Note    Patient: Miguel Monsalve : 1945  CSN #: [id number]   Referring Practitioner:  Dr. Joe Uriostegui DO    Referral Date : 19     Date: 2019    Diagnosis: R knee tendinitis  Treatment Diagnosis: Difficulty walking; R knee pain and arthritis; radicular symptoms    Onset Date: 14  PT Insurance Information: Medicare/United healthcare  Total # of Visits Approved: 12 Per Physician Order  Total # of Visits to Date: 14  No Show: 0  Canceled Appointment: 0      Pre-Treatment Pain:  10/10  Subjective: Pt. reports \"10/10\" pain in the R knee that gets a little better with the heating pad and the pool has helped loosen things up but it's still really painful. Modalities:  Moist heat: MHP x15 minutes in supine to R knee to decrease pain        Assessment  Assessment: At this time, patient is independent with HEP and aquatic therapy and has met goals for improved B LE strength to 4/5 grossly at all major joints and planes and improved core strength to 4/5. Pt. has also met goal for improved R knee AROM of 0-128* this date but continues to have pain with all movements and with [address] Pt. reports overall aquatic therapy has helped \"loosen things up\" but she is still having a lot of pain. Pt. educated to continue HEP at home to maintain gains in ROM and strength. Will d/c patient due to having met most goals; patient to return to doctor for follow-up regarding continued pain. Patient Education  Cont HEP upon d/c; follow up with doctor for bell    Pt verbalized/demonstrated good understanding:     [x] Yes         [] No, pt required further clarification.     Post Treatment Pain:  7/10      Plan  Times per week: 3  Plan weeks: 4-6      Goals  (Total # of Visits to Date: 15)   Short Term Goals - Time Frame for Short term goals: 3 weeks     Short term goal 1: Pt. to initiate HEP to decrease pain and improve ROM and abdominal strength. -MET                                        []Met   []Partially met  []Not met   Short term goal 2: Pt. to intiate aquatic therapy to strengthen B LE's and decreased R knee pain and radicular symptoms -MET  []Met   []Partially met  []Not met   Short term goal 3: Pt. to report decrease in pain with walking and standing to </=6/10 for improved QOL. []Met   []Partially met  []Not met      []Met   []Partially met  []Not met     Long Term Goals - Time Frame for Long term goals : 6 weeks  Long term goal 1: Pt. to be independent with aquatic exercise program and HEP. -MET []Met  []Partially met  []Not met   Long term goal 2: Pt. to demo improved core strength >/=4/5 and B LE strength to >/=4/5 at all major joints grossly per MMT to improve functional strength. -MET []Met  []Partially met  []Not met   Long term goal 3: Pt. to have improved R knee AROM 0-125* with no pain and have improved lumbar AROM flexion: 4 inches from toes to improve functional mobility. -MET (0-132*) []Met  []Partially met  []Not met   Long term goal 4: Pt. to report no radicular symptoms and decreased R knee and L hip pain to </=3/10 for improved QOL.   []Met  []Partially met  []Not met     []Met  []Partially met  []Not met       Minutes Tracking:  Time In: 1592  Time Out: 1000  Minutes: Beckie Boone 95, PT, DPT     Date: 6/19/2019

## 2019-08-29 ENCOUNTER — HOSPITAL ENCOUNTER (OUTPATIENT)
Dept: PREADMISSION TESTING | Age: 74
Discharge: HOME | End: 2019-09-02
Payer: MEDICARE

## 2019-08-29 VITALS
BODY MASS INDEX: 26.65 KG/M2 | OXYGEN SATURATION: 97 % | RESPIRATION RATE: 18 BRPM | TEMPERATURE: 97 F | WEIGHT: 156.1 LBS | HEART RATE: 60 BPM | HEIGHT: 64 IN

## 2019-08-29 LAB
ABSOLUTE EOS #: 0.29 K/UL (ref 0–0.44)
ABSOLUTE IMMATURE GRANULOCYTE: <0.03 K/UL (ref 0–0.3)
ABSOLUTE LYMPH #: 1.98 K/UL (ref 1.1–3.7)
ABSOLUTE MONO #: 0.67 K/UL (ref 0.1–1.2)
ANION GAP SERPL CALCULATED.3IONS-SCNC: 11 MMOL/L (ref 9–17)
BASOPHILS # BLD: 1 % (ref 0–2)
BASOPHILS ABSOLUTE: 0.05 K/UL (ref 0–0.2)
BUN BLDV-MCNC: 12 MG/DL (ref 8–23)
BUN/CREAT BLD: 12 (ref 9–20)
CALCIUM SERPL-MCNC: 9.7 MG/DL (ref 8.6–10.4)
CHLORIDE BLD-SCNC: 103 MMOL/L (ref 98–107)
CO2: 28 MMOL/L (ref 20–31)
CREAT SERPL-MCNC: 0.98 MG/DL (ref 0.5–0.9)
DIFFERENTIAL TYPE: NORMAL
EKG ATRIAL RATE: 55 BPM
EKG P AXIS: 44 DEGREES
EKG P-R INTERVAL: 146 MS
EKG Q-T INTERVAL: 472 MS
EKG QRS DURATION: 74 MS
EKG QTC CALCULATION (BAZETT): 451 MS
EKG R AXIS: 13 DEGREES
EKG T AXIS: 43 DEGREES
EKG VENTRICULAR RATE: 55 BPM
EOSINOPHILS RELATIVE PERCENT: 4 % (ref 1–4)
ESTIMATED AVERAGE GLUCOSE: 134 MG/DL
GFR AFRICAN AMERICAN: >60 ML/MIN
GFR NON-AFRICAN AMERICAN: 56 ML/MIN
GFR SERPL CREATININE-BSD FRML MDRD: ABNORMAL ML/MIN/{1.73_M2}
GFR SERPL CREATININE-BSD FRML MDRD: ABNORMAL ML/MIN/{1.73_M2}
GLUCOSE BLD-MCNC: 122 MG/DL (ref 70–99)
HBA1C MFR BLD: 6.3 % (ref 4.8–5.9)
HCT VFR BLD CALC: 44.8 % (ref 36.3–47.1)
HEMOGLOBIN: 13.8 G/DL (ref 11.9–15.1)
IMMATURE GRANULOCYTES: 0 %
LYMPHOCYTES # BLD: 28 % (ref 24–43)
MCH RBC QN AUTO: 28.2 PG (ref 25.2–33.5)
MCHC RBC AUTO-ENTMCNC: 30.8 G/DL (ref 28.4–34.8)
MCV RBC AUTO: 91.6 FL (ref 82.6–102.9)
MONOCYTES # BLD: 9 % (ref 3–12)
MRSA, DNA, NASAL: NORMAL
NRBC AUTOMATED: 0 PER 100 WBC
PDW BLD-RTO: 12.7 % (ref 11.8–14.4)
PLATELET # BLD: 264 K/UL (ref 138–453)
PLATELET ESTIMATE: NORMAL
PMV BLD AUTO: 10.2 FL (ref 8.1–13.5)
POTASSIUM SERPL-SCNC: 4.7 MMOL/L (ref 3.7–5.3)
RBC # BLD: 4.89 M/UL (ref 3.95–5.11)
RBC # BLD: NORMAL 10*6/UL
SEG NEUTROPHILS: 58 % (ref 36–65)
SEGMENTED NEUTROPHILS ABSOLUTE COUNT: 4.16 K/UL (ref 1.5–8.1)
SODIUM BLD-SCNC: 142 MMOL/L (ref 135–144)
SPECIMEN DESCRIPTION: NORMAL
WBC # BLD: 7.2 K/UL (ref 3.5–11.3)
WBC # BLD: NORMAL 10*3/UL

## 2019-08-29 PROCEDURE — 93005 ELECTROCARDIOGRAM TRACING: CPT | Performed by: ORTHOPAEDIC SURGERY

## 2019-08-29 PROCEDURE — 85025 COMPLETE CBC W/AUTO DIFF WBC: CPT

## 2019-08-29 PROCEDURE — 83036 HEMOGLOBIN GLYCOSYLATED A1C: CPT

## 2019-08-29 PROCEDURE — 93010 ELECTROCARDIOGRAM REPORT: CPT | Performed by: INTERNAL MEDICINE

## 2019-08-29 PROCEDURE — 36415 COLL VENOUS BLD VENIPUNCTURE: CPT

## 2019-08-29 PROCEDURE — 80048 BASIC METABOLIC PNL TOTAL CA: CPT

## 2019-08-29 PROCEDURE — 87641 MR-STAPH DNA AMP PROBE: CPT

## 2019-08-29 RX ORDER — TRANEXAMIC ACID 650 1/1
1950 TABLET ORAL ONCE
Status: CANCELLED | OUTPATIENT
Start: 2019-08-29

## 2019-08-29 RX ORDER — FLUOXETINE HYDROCHLORIDE 20 MG/1
20 CAPSULE ORAL EVERY OTHER DAY
COMMUNITY

## 2019-08-29 RX ORDER — ACETAMINOPHEN 325 MG/1
650 TABLET ORAL ONCE
Status: CANCELLED | OUTPATIENT
Start: 2019-08-29 | End: 2019-08-29

## 2019-08-29 RX ORDER — DIMENHYDRINATE 50 MG/1
50 TABLET ORAL NIGHTLY PRN
Status: CANCELLED | OUTPATIENT
Start: 2019-08-29

## 2019-08-29 RX ORDER — ROSUVASTATIN CALCIUM 40 MG/1
40 TABLET, COATED ORAL EVERY EVENING
COMMUNITY

## 2019-08-29 RX ORDER — DULOXETIN HYDROCHLORIDE 60 MG/1
60 CAPSULE, DELAYED RELEASE ORAL DAILY
COMMUNITY

## 2019-08-29 RX ORDER — SODIUM CHLORIDE, SODIUM LACTATE, POTASSIUM CHLORIDE, CALCIUM CHLORIDE 600; 310; 30; 20 MG/100ML; MG/100ML; MG/100ML; MG/100ML
INJECTION, SOLUTION INTRAVENOUS CONTINUOUS
Status: CANCELLED | OUTPATIENT
Start: 2019-08-29

## 2019-08-29 ASSESSMENT — PAIN DESCRIPTION - ORIENTATION: ORIENTATION: RIGHT

## 2019-08-29 ASSESSMENT — PAIN DESCRIPTION - LOCATION: LOCATION: KNEE

## 2019-08-29 ASSESSMENT — PAIN DESCRIPTION - PAIN TYPE: TYPE: CHRONIC PAIN

## 2019-08-29 ASSESSMENT — PAIN SCALES - GENERAL: PAINLEVEL_OUTOF10: 9

## 2019-08-29 ASSESSMENT — PAIN DESCRIPTION - FREQUENCY: FREQUENCY: CONTINUOUS

## 2019-08-29 NOTE — PROGRESS NOTES
Patient: self  Surgery Time Verified: Yes  Surgery Location Verified: Yes  Patient Language: English  Medical History Reviewed: Yes  NPO Status Reinforced: Yes  Ride and Caregiver Arranged: Yes  Ride Caregiver Provider: Sister  Patient Knows to Bring Current Medications: Yes    Pre-AdmissionTesting Checklist  Patient has been to this health system before?: Yes, W/in last 12 months  Does patient refuse blood?: No  Pre-existing DNR Comfort Care/DNR Arrest/DNI Order: No  Healthcare Directive: No, patient does not have an advance directive for healthcare treatment   needed: No  Patient can read and write?: Yes  Meds-to-Beds: Does the patient want to have any new prescriptions delivered to bedside prior to discharge?: No  History given by: Patient  Providing self care at home?: Yes  Assistive Devices with patient: Minal Ramey  Discharge transport (for same day patients): Family    Patient instructed on the pre-operative, intra-operative, and post-operative process? Yes  Medication instructions reviewed with patient? Yes  Pre operative instruction sheet reviewed and given to patient in PAT?   Yes

## 2019-09-13 ENCOUNTER — HOSPITAL ENCOUNTER (OUTPATIENT)
Dept: LAB | Age: 74
Discharge: HOME | End: 2019-09-13
Payer: MEDICARE

## 2019-09-13 LAB
ABO/RH: NORMAL
ANTIBODY SCREEN: NEGATIVE
ARM BAND NUMBER: NORMAL
EXPIRATION DATE: NORMAL

## 2019-09-13 PROCEDURE — 86850 RBC ANTIBODY SCREEN: CPT

## 2019-09-13 PROCEDURE — 86900 BLOOD TYPING SEROLOGIC ABO: CPT

## 2019-09-13 PROCEDURE — 86901 BLOOD TYPING SEROLOGIC RH(D): CPT

## 2019-09-13 PROCEDURE — 36415 COLL VENOUS BLD VENIPUNCTURE: CPT

## 2019-09-17 ENCOUNTER — ANESTHESIA (OUTPATIENT)
Dept: OPERATING ROOM | Age: 74
DRG: 470 | End: 2019-09-17
Payer: MEDICARE

## 2019-09-17 ENCOUNTER — APPOINTMENT (OUTPATIENT)
Dept: GENERAL RADIOLOGY | Age: 74
DRG: 470 | End: 2019-09-17
Attending: ORTHOPAEDIC SURGERY
Payer: MEDICARE

## 2019-09-17 ENCOUNTER — ANESTHESIA EVENT (OUTPATIENT)
Dept: OPERATING ROOM | Age: 74
DRG: 470 | End: 2019-09-17
Payer: MEDICARE

## 2019-09-17 ENCOUNTER — HOSPITAL ENCOUNTER (INPATIENT)
Age: 74
LOS: 3 days | Discharge: NURSING, REHAB OR LONG-TERM CARE | DRG: 470 | End: 2019-09-20
Attending: ORTHOPAEDIC SURGERY | Admitting: ORTHOPAEDIC SURGERY
Payer: MEDICARE

## 2019-09-17 VITALS
OXYGEN SATURATION: 98 % | TEMPERATURE: 95.8 F | SYSTOLIC BLOOD PRESSURE: 132 MMHG | RESPIRATION RATE: 1 BRPM | DIASTOLIC BLOOD PRESSURE: 60 MMHG

## 2019-09-17 DIAGNOSIS — G89.18 POST-OP PAIN: Primary | ICD-10-CM

## 2019-09-17 DIAGNOSIS — I10 ESSENTIAL HYPERTENSION: ICD-10-CM

## 2019-09-17 DIAGNOSIS — M17.11 PRIMARY OSTEOARTHRITIS OF RIGHT KNEE: ICD-10-CM

## 2019-09-17 LAB — GLUCOSE BLD-MCNC: 114 MG/DL (ref 74–100)

## 2019-09-17 PROCEDURE — 6370000000 HC RX 637 (ALT 250 FOR IP): Performed by: ORTHOPAEDIC SURGERY

## 2019-09-17 PROCEDURE — 7100000001 HC PACU RECOVERY - ADDTL 15 MIN: Performed by: ORTHOPAEDIC SURGERY

## 2019-09-17 PROCEDURE — 6360000002 HC RX W HCPCS: Performed by: NURSE ANESTHETIST, CERTIFIED REGISTERED

## 2019-09-17 PROCEDURE — 82947 ASSAY GLUCOSE BLOOD QUANT: CPT

## 2019-09-17 PROCEDURE — 2580000003 HC RX 258: Performed by: ORTHOPAEDIC SURGERY

## 2019-09-17 PROCEDURE — 3600000005 HC SURGERY LEVEL 5 BASE: Performed by: ORTHOPAEDIC SURGERY

## 2019-09-17 PROCEDURE — 76942 ECHO GUIDE FOR BIOPSY: CPT | Performed by: NURSE ANESTHETIST, CERTIFIED REGISTERED

## 2019-09-17 PROCEDURE — 6360000002 HC RX W HCPCS: Performed by: ORTHOPAEDIC SURGERY

## 2019-09-17 PROCEDURE — 2720000010 HC SURG SUPPLY STERILE: Performed by: ORTHOPAEDIC SURGERY

## 2019-09-17 PROCEDURE — 2709999900 HC NON-CHARGEABLE SUPPLY: Performed by: ORTHOPAEDIC SURGERY

## 2019-09-17 PROCEDURE — C1776 JOINT DEVICE (IMPLANTABLE): HCPCS | Performed by: ORTHOPAEDIC SURGERY

## 2019-09-17 PROCEDURE — 1200000000 HC SEMI PRIVATE

## 2019-09-17 PROCEDURE — 97166 OT EVAL MOD COMPLEX 45 MIN: CPT

## 2019-09-17 PROCEDURE — 0SRC069 REPLACEMENT OF RIGHT KNEE JOINT WITH OXIDIZED ZIRCONIUM ON POLYETHYLENE SYNTHETIC SUBSTITUTE, CEMENTED, OPEN APPROACH: ICD-10-PCS | Performed by: ORTHOPAEDIC SURGERY

## 2019-09-17 PROCEDURE — 94760 N-INVAS EAR/PLS OXIMETRY 1: CPT

## 2019-09-17 PROCEDURE — 3700000001 HC ADD 15 MINUTES (ANESTHESIA): Performed by: ORTHOPAEDIC SURGERY

## 2019-09-17 PROCEDURE — 73560 X-RAY EXAM OF KNEE 1 OR 2: CPT

## 2019-09-17 PROCEDURE — 94664 DEMO&/EVAL PT USE INHALER: CPT

## 2019-09-17 PROCEDURE — 2500000003 HC RX 250 WO HCPCS: Performed by: NURSE ANESTHETIST, CERTIFIED REGISTERED

## 2019-09-17 PROCEDURE — 3600000015 HC SURGERY LEVEL 5 ADDTL 15MIN: Performed by: ORTHOPAEDIC SURGERY

## 2019-09-17 PROCEDURE — 7100000000 HC PACU RECOVERY - FIRST 15 MIN: Performed by: ORTHOPAEDIC SURGERY

## 2019-09-17 PROCEDURE — C1713 ANCHOR/SCREW BN/BN,TIS/BN: HCPCS | Performed by: ORTHOPAEDIC SURGERY

## 2019-09-17 PROCEDURE — 3700000000 HC ANESTHESIA ATTENDED CARE: Performed by: ORTHOPAEDIC SURGERY

## 2019-09-17 DEVICE — CEMENT BNE 40GM HI VISC RADPQ FOR REV SURG: Type: IMPLANTABLE DEVICE | Site: KNEE | Status: FUNCTIONAL

## 2019-09-17 DEVICE — GENESIS II NON-POROUS TIBIAL                                    BASEPLATE SIZE 4 RIGHT
Type: IMPLANTABLE DEVICE | Site: KNEE | Status: FUNCTIONAL
Brand: GENESIS II

## 2019-09-17 DEVICE — LEGION CRUCIATE RETAINING                                    NONPOROUS FEMORAL SIZE 5 RIGHT
Type: IMPLANTABLE DEVICE | Site: KNEE | Status: FUNCTIONAL
Brand: LEGION

## 2019-09-17 DEVICE — DISCONTINUED USE 415964 CEMENT PALACOS R + G SING DOSE 40GR: Type: IMPLANTABLE DEVICE | Site: KNEE | Status: FUNCTIONAL

## 2019-09-17 DEVICE — GENESIS II OVAL RESURFACING                                    PATELLAR 35MM
Type: IMPLANTABLE DEVICE | Site: KNEE | Status: FUNCTIONAL
Brand: GENESIS II

## 2019-09-17 RX ORDER — NITROGLYCERIN 0.4 MG/1
0.4 TABLET SUBLINGUAL EVERY 5 MIN PRN
Status: DISCONTINUED | OUTPATIENT
Start: 2019-09-17 | End: 2019-09-20 | Stop reason: HOSPADM

## 2019-09-17 RX ORDER — OXYCODONE HYDROCHLORIDE 5 MG/1
5 TABLET ORAL EVERY 4 HOURS PRN
Status: DISCONTINUED | OUTPATIENT
Start: 2019-09-17 | End: 2019-09-20 | Stop reason: HOSPADM

## 2019-09-17 RX ORDER — DULOXETIN HYDROCHLORIDE 60 MG/1
60 CAPSULE, DELAYED RELEASE ORAL DAILY
Status: DISCONTINUED | OUTPATIENT
Start: 2019-09-17 | End: 2019-09-20 | Stop reason: HOSPADM

## 2019-09-17 RX ORDER — SODIUM CHLORIDE 0.9 % (FLUSH) 0.9 %
10 SYRINGE (ML) INJECTION EVERY 12 HOURS SCHEDULED
Status: DISCONTINUED | OUTPATIENT
Start: 2019-09-17 | End: 2019-09-20 | Stop reason: HOSPADM

## 2019-09-17 RX ORDER — TRANEXAMIC ACID 650 1/1
1950 TABLET ORAL ONCE
Status: COMPLETED | OUTPATIENT
Start: 2019-09-17 | End: 2019-09-17

## 2019-09-17 RX ORDER — ALBUTEROL SULFATE 90 UG/1
2 AEROSOL, METERED RESPIRATORY (INHALATION) EVERY 6 HOURS PRN
Status: DISCONTINUED | OUTPATIENT
Start: 2019-09-17 | End: 2019-09-20 | Stop reason: HOSPADM

## 2019-09-17 RX ORDER — PROPOFOL 10 MG/ML
INJECTION, EMULSION INTRAVENOUS CONTINUOUS PRN
Status: DISCONTINUED | OUTPATIENT
Start: 2019-09-17 | End: 2019-09-17 | Stop reason: SDUPTHER

## 2019-09-17 RX ORDER — DEXAMETHASONE SODIUM PHOSPHATE 10 MG/ML
INJECTION INTRAMUSCULAR; INTRAVENOUS PRN
Status: DISCONTINUED | OUTPATIENT
Start: 2019-09-17 | End: 2019-09-17 | Stop reason: SDUPTHER

## 2019-09-17 RX ORDER — METOCLOPRAMIDE HYDROCHLORIDE 5 MG/ML
10 INJECTION INTRAMUSCULAR; INTRAVENOUS
Status: DISCONTINUED | OUTPATIENT
Start: 2019-09-17 | End: 2019-09-17 | Stop reason: HOSPADM

## 2019-09-17 RX ORDER — DIPHENHYDRAMINE HYDROCHLORIDE 50 MG/ML
INJECTION INTRAMUSCULAR; INTRAVENOUS PRN
Status: DISCONTINUED | OUTPATIENT
Start: 2019-09-17 | End: 2019-09-17 | Stop reason: SDUPTHER

## 2019-09-17 RX ORDER — FLUTICASONE PROPIONATE 110 UG/1
2 AEROSOL, METERED RESPIRATORY (INHALATION) 2 TIMES DAILY
Status: DISCONTINUED | OUTPATIENT
Start: 2019-09-17 | End: 2019-09-20 | Stop reason: HOSPADM

## 2019-09-17 RX ORDER — MONTELUKAST SODIUM 10 MG/1
10 TABLET ORAL NIGHTLY
Status: DISCONTINUED | OUTPATIENT
Start: 2019-09-17 | End: 2019-09-20 | Stop reason: HOSPADM

## 2019-09-17 RX ORDER — DEXAMETHASONE SODIUM PHOSPHATE 4 MG/ML
INJECTION, SOLUTION INTRA-ARTICULAR; INTRALESIONAL; INTRAMUSCULAR; INTRAVENOUS; SOFT TISSUE PRN
Status: DISCONTINUED | OUTPATIENT
Start: 2019-09-17 | End: 2019-09-17 | Stop reason: SDUPTHER

## 2019-09-17 RX ORDER — FENTANYL CITRATE 50 UG/ML
50 INJECTION, SOLUTION INTRAMUSCULAR; INTRAVENOUS EVERY 5 MIN PRN
Status: DISCONTINUED | OUTPATIENT
Start: 2019-09-17 | End: 2019-09-17 | Stop reason: HOSPADM

## 2019-09-17 RX ORDER — ACETAMINOPHEN 325 MG/1
650 TABLET ORAL ONCE
Status: COMPLETED | OUTPATIENT
Start: 2019-09-17 | End: 2019-09-17

## 2019-09-17 RX ORDER — ROSUVASTATIN CALCIUM 20 MG/1
40 TABLET, COATED ORAL EVERY EVENING
Status: DISCONTINUED | OUTPATIENT
Start: 2019-09-17 | End: 2019-09-20 | Stop reason: HOSPADM

## 2019-09-17 RX ORDER — DOCUSATE SODIUM 100 MG/1
100 CAPSULE, LIQUID FILLED ORAL 2 TIMES DAILY
Status: DISCONTINUED | OUTPATIENT
Start: 2019-09-17 | End: 2019-09-20 | Stop reason: HOSPADM

## 2019-09-17 RX ORDER — EPHEDRINE SULFATE/0.9% NACL/PF 50 MG/5 ML
SYRINGE (ML) INTRAVENOUS PRN
Status: DISCONTINUED | OUTPATIENT
Start: 2019-09-17 | End: 2019-09-17 | Stop reason: SDUPTHER

## 2019-09-17 RX ORDER — ROPIVACAINE HYDROCHLORIDE 5 MG/ML
INJECTION, SOLUTION EPIDURAL; INFILTRATION; PERINEURAL PRN
Status: DISCONTINUED | OUTPATIENT
Start: 2019-09-17 | End: 2019-09-17 | Stop reason: SDUPTHER

## 2019-09-17 RX ORDER — SODIUM CHLORIDE, SODIUM LACTATE, POTASSIUM CHLORIDE, CALCIUM CHLORIDE 600; 310; 30; 20 MG/100ML; MG/100ML; MG/100ML; MG/100ML
INJECTION, SOLUTION INTRAVENOUS CONTINUOUS
Status: DISCONTINUED | OUTPATIENT
Start: 2019-09-17 | End: 2019-09-20 | Stop reason: HOSPADM

## 2019-09-17 RX ORDER — MORPHINE SULFATE 4 MG/ML
4 INJECTION, SOLUTION INTRAMUSCULAR; INTRAVENOUS
Status: DISCONTINUED | OUTPATIENT
Start: 2019-09-17 | End: 2019-09-20 | Stop reason: HOSPADM

## 2019-09-17 RX ORDER — FAMOTIDINE 20 MG/1
20 TABLET, FILM COATED ORAL 2 TIMES DAILY
Status: DISCONTINUED | OUTPATIENT
Start: 2019-09-17 | End: 2019-09-20 | Stop reason: HOSPADM

## 2019-09-17 RX ORDER — PROPOFOL 10 MG/ML
INJECTION, EMULSION INTRAVENOUS PRN
Status: DISCONTINUED | OUTPATIENT
Start: 2019-09-17 | End: 2019-09-17 | Stop reason: SDUPTHER

## 2019-09-17 RX ORDER — BENZONATATE 100 MG/1
200 CAPSULE ORAL 3 TIMES DAILY PRN
Status: DISCONTINUED | OUTPATIENT
Start: 2019-09-17 | End: 2019-09-20 | Stop reason: HOSPADM

## 2019-09-17 RX ORDER — M-VIT,TX,IRON,MINS/CALC/FOLIC 27MG-0.4MG
1 TABLET ORAL DAILY
Status: DISCONTINUED | OUTPATIENT
Start: 2019-09-18 | End: 2019-09-20 | Stop reason: HOSPADM

## 2019-09-17 RX ORDER — PHENYLEPHRINE HYDROCHLORIDE 10 MG/ML
INJECTION INTRAVENOUS PRN
Status: DISCONTINUED | OUTPATIENT
Start: 2019-09-17 | End: 2019-09-17 | Stop reason: SDUPTHER

## 2019-09-17 RX ORDER — ONDANSETRON 2 MG/ML
4 INJECTION INTRAMUSCULAR; INTRAVENOUS EVERY 6 HOURS PRN
Status: DISCONTINUED | OUTPATIENT
Start: 2019-09-17 | End: 2019-09-20 | Stop reason: HOSPADM

## 2019-09-17 RX ORDER — OXYCODONE HYDROCHLORIDE 5 MG/1
10 TABLET ORAL EVERY 4 HOURS PRN
Status: DISCONTINUED | OUTPATIENT
Start: 2019-09-17 | End: 2019-09-20 | Stop reason: HOSPADM

## 2019-09-17 RX ORDER — AZELASTINE HCL 205.5 UG/1
1 SPRAY NASAL PRN
Status: DISCONTINUED | OUTPATIENT
Start: 2019-09-17 | End: 2019-09-20 | Stop reason: HOSPADM

## 2019-09-17 RX ORDER — ONDANSETRON 2 MG/ML
4 INJECTION INTRAMUSCULAR; INTRAVENOUS
Status: DISCONTINUED | OUTPATIENT
Start: 2019-09-17 | End: 2019-09-17 | Stop reason: HOSPADM

## 2019-09-17 RX ORDER — FENTANYL CITRATE 50 UG/ML
25 INJECTION, SOLUTION INTRAMUSCULAR; INTRAVENOUS EVERY 5 MIN PRN
Status: DISCONTINUED | OUTPATIENT
Start: 2019-09-17 | End: 2019-09-17 | Stop reason: HOSPADM

## 2019-09-17 RX ORDER — ASPIRIN 81 MG/1
81 TABLET, CHEWABLE ORAL DAILY
Status: DISCONTINUED | OUTPATIENT
Start: 2019-09-18 | End: 2019-09-20 | Stop reason: HOSPADM

## 2019-09-17 RX ORDER — DIMENHYDRINATE 50 MG/1
50 TABLET ORAL NIGHTLY PRN
Status: DISCONTINUED | OUTPATIENT
Start: 2019-09-17 | End: 2019-09-17

## 2019-09-17 RX ORDER — SODIUM CHLORIDE 0.9 % (FLUSH) 0.9 %
10 SYRINGE (ML) INJECTION PRN
Status: DISCONTINUED | OUTPATIENT
Start: 2019-09-17 | End: 2019-09-20 | Stop reason: HOSPADM

## 2019-09-17 RX ORDER — BUPROPION HYDROCHLORIDE 150 MG/1
150 TABLET, EXTENDED RELEASE ORAL NIGHTLY
Status: DISCONTINUED | OUTPATIENT
Start: 2019-09-17 | End: 2019-09-20 | Stop reason: HOSPADM

## 2019-09-17 RX ORDER — MORPHINE SULFATE 2 MG/ML
2 INJECTION, SOLUTION INTRAMUSCULAR; INTRAVENOUS
Status: DISCONTINUED | OUTPATIENT
Start: 2019-09-17 | End: 2019-09-20 | Stop reason: HOSPADM

## 2019-09-17 RX ORDER — FLUOXETINE HYDROCHLORIDE 20 MG/1
20 CAPSULE ORAL EVERY OTHER DAY
Status: DISCONTINUED | OUTPATIENT
Start: 2019-09-18 | End: 2019-09-20 | Stop reason: HOSPADM

## 2019-09-17 RX ORDER — PROMETHAZINE HYDROCHLORIDE AND CODEINE PHOSPHATE 6.25; 1 MG/5ML; MG/5ML
5 SYRUP ORAL 4 TIMES DAILY PRN
Status: DISCONTINUED | OUTPATIENT
Start: 2019-09-17 | End: 2019-09-20 | Stop reason: HOSPADM

## 2019-09-17 RX ORDER — GABAPENTIN 400 MG/1
400 CAPSULE ORAL EVERY EVENING
Status: DISCONTINUED | OUTPATIENT
Start: 2019-09-17 | End: 2019-09-20 | Stop reason: HOSPADM

## 2019-09-17 RX ORDER — FENTANYL CITRATE 50 UG/ML
INJECTION, SOLUTION INTRAMUSCULAR; INTRAVENOUS PRN
Status: DISCONTINUED | OUTPATIENT
Start: 2019-09-17 | End: 2019-09-17 | Stop reason: SDUPTHER

## 2019-09-17 RX ORDER — CLONIDINE 100 UG/ML
INJECTION, SOLUTION EPIDURAL PRN
Status: DISCONTINUED | OUTPATIENT
Start: 2019-09-17 | End: 2019-09-17 | Stop reason: SDUPTHER

## 2019-09-17 RX ORDER — ONDANSETRON 2 MG/ML
INJECTION INTRAMUSCULAR; INTRAVENOUS PRN
Status: DISCONTINUED | OUTPATIENT
Start: 2019-09-17 | End: 2019-09-17 | Stop reason: SDUPTHER

## 2019-09-17 RX ADMIN — PHENYLEPHRINE HYDROCHLORIDE 50 MCG: 10 INJECTION INTRAVENOUS at 13:00

## 2019-09-17 RX ADMIN — PHENYLEPHRINE HYDROCHLORIDE 100 MCG: 10 INJECTION INTRAVENOUS at 12:47

## 2019-09-17 RX ADMIN — MORPHINE SULFATE 4 MG: 4 INJECTION INTRAVENOUS at 22:15

## 2019-09-17 RX ADMIN — DIPHENHYDRAMINE HYDROCHLORIDE 25 MG: 50 INJECTION INTRAMUSCULAR; INTRAVENOUS at 12:58

## 2019-09-17 RX ADMIN — PHENYLEPHRINE HYDROCHLORIDE 100 MCG: 10 INJECTION INTRAVENOUS at 14:10

## 2019-09-17 RX ADMIN — FENTANYL CITRATE 25 MCG: 50 INJECTION INTRAMUSCULAR; INTRAVENOUS at 12:59

## 2019-09-17 RX ADMIN — GABAPENTIN 400 MG: 400 CAPSULE ORAL at 20:27

## 2019-09-17 RX ADMIN — DOCUSATE SODIUM 100 MG: 100 CAPSULE, LIQUID FILLED ORAL at 20:26

## 2019-09-17 RX ADMIN — SODIUM CHLORIDE, POTASSIUM CHLORIDE, SODIUM LACTATE AND CALCIUM CHLORIDE: 600; 310; 30; 20 INJECTION, SOLUTION INTRAVENOUS at 10:43

## 2019-09-17 RX ADMIN — Medication 15 MG: at 14:01

## 2019-09-17 RX ADMIN — SODIUM CHLORIDE, POTASSIUM CHLORIDE, SODIUM LACTATE AND CALCIUM CHLORIDE: 600; 310; 30; 20 INJECTION, SOLUTION INTRAVENOUS at 19:35

## 2019-09-17 RX ADMIN — OXYCODONE HYDROCHLORIDE 10 MG: 5 TABLET ORAL at 19:06

## 2019-09-17 RX ADMIN — FAMOTIDINE 20 MG: 20 TABLET ORAL at 20:27

## 2019-09-17 RX ADMIN — Medication 10 MG: at 12:39

## 2019-09-17 RX ADMIN — CLONIDINE HYDROCHLORIDE 150 MCG: 100 INJECTION, SOLUTION INTRAVENOUS at 12:51

## 2019-09-17 RX ADMIN — PHENYLEPHRINE HYDROCHLORIDE 50 MCG: 10 INJECTION INTRAVENOUS at 13:45

## 2019-09-17 RX ADMIN — DIMENHYDRINATE 50 MG: 50 TABLET ORAL at 10:31

## 2019-09-17 RX ADMIN — SODIUM CHLORIDE, POTASSIUM CHLORIDE, SODIUM LACTATE AND CALCIUM CHLORIDE: 600; 310; 30; 20 INJECTION, SOLUTION INTRAVENOUS at 13:17

## 2019-09-17 RX ADMIN — CEFAZOLIN 2 G: 1 INJECTION, POWDER, FOR SOLUTION INTRAMUSCULAR; INTRAVENOUS at 19:35

## 2019-09-17 RX ADMIN — FENTANYL CITRATE 50 MCG: 50 INJECTION INTRAMUSCULAR; INTRAVENOUS at 10:48

## 2019-09-17 RX ADMIN — DULOXETINE HYDROCHLORIDE 60 MG: 60 CAPSULE, DELAYED RELEASE ORAL at 20:26

## 2019-09-17 RX ADMIN — TRANEXAMIC ACID 1950 MG: 650 TABLET ORAL at 10:31

## 2019-09-17 RX ADMIN — ACETAMINOPHEN 650 MG: 325 TABLET, FILM COATED ORAL at 10:31

## 2019-09-17 RX ADMIN — ROSUVASTATIN CALCIUM 40 MG: 20 TABLET, FILM COATED ORAL at 20:26

## 2019-09-17 RX ADMIN — MONTELUKAST SODIUM 10 MG: 10 TABLET, FILM COATED ORAL at 20:27

## 2019-09-17 RX ADMIN — PROPOFOL 70 MG: 10 INJECTION, EMULSION INTRAVENOUS at 12:27

## 2019-09-17 RX ADMIN — PHENYLEPHRINE HYDROCHLORIDE 100 MCG: 10 INJECTION INTRAVENOUS at 13:31

## 2019-09-17 RX ADMIN — BUPROPION HYDROCHLORIDE 150 MG: 150 TABLET, EXTENDED RELEASE ORAL at 20:27

## 2019-09-17 RX ADMIN — Medication 15 MG: at 12:45

## 2019-09-17 RX ADMIN — PROPOFOL 50 MCG/KG/MIN: 10 INJECTION, EMULSION INTRAVENOUS at 12:31

## 2019-09-17 RX ADMIN — FENTANYL CITRATE 25 MCG: 50 INJECTION INTRAMUSCULAR; INTRAVENOUS at 12:16

## 2019-09-17 RX ADMIN — Medication 10 MG: at 12:37

## 2019-09-17 RX ADMIN — PHENYLEPHRINE HYDROCHLORIDE 50 MCG: 10 INJECTION INTRAVENOUS at 13:55

## 2019-09-17 RX ADMIN — DEXAMETHASONE SODIUM PHOSPHATE 4 MG: 4 INJECTION, SOLUTION INTRAMUSCULAR; INTRAVENOUS at 12:56

## 2019-09-17 RX ADMIN — DEXAMETHASONE SODIUM PHOSPHATE 10 MG: 10 INJECTION INTRAMUSCULAR; INTRAVENOUS at 10:57

## 2019-09-17 RX ADMIN — CEFAZOLIN 2 G: 1 INJECTION, POWDER, FOR SOLUTION INTRAMUSCULAR; INTRAVENOUS at 12:11

## 2019-09-17 RX ADMIN — FLUTICASONE PROPIONATE 2 PUFF: 110 AEROSOL, METERED RESPIRATORY (INHALATION) at 20:44

## 2019-09-17 RX ADMIN — ALBUTEROL SULFATE 2 PUFF: 90 AEROSOL, METERED RESPIRATORY (INHALATION) at 21:43

## 2019-09-17 RX ADMIN — ONDANSETRON 4 MG: 2 INJECTION INTRAMUSCULAR; INTRAVENOUS at 12:56

## 2019-09-17 RX ADMIN — ROPIVACAINE HYDROCHLORIDE 50 ML: 5 INJECTION, SOLUTION EPIDURAL; INFILTRATION; PERINEURAL at 10:57

## 2019-09-17 RX ADMIN — PHENYLEPHRINE HYDROCHLORIDE 50 MCG: 10 INJECTION INTRAVENOUS at 13:12

## 2019-09-17 ASSESSMENT — PULMONARY FUNCTION TESTS
PIF_VALUE: 1
PIF_VALUE: -15
PIF_VALUE: 1
PIF_VALUE: -15
PIF_VALUE: 1
PIF_VALUE: 0
PIF_VALUE: -15
PIF_VALUE: 1
PIF_VALUE: 3
PIF_VALUE: 1
PIF_VALUE: -15
PIF_VALUE: 1
PIF_VALUE: 4
PIF_VALUE: -15
PIF_VALUE: 1
PIF_VALUE: -15
PIF_VALUE: 1
PIF_VALUE: -15
PIF_VALUE: 1
PIF_VALUE: -15
PIF_VALUE: 1
PIF_VALUE: 2
PIF_VALUE: 1
PIF_VALUE: 5
PIF_VALUE: 1
PIF_VALUE: 1
PIF_VALUE: -15
PIF_VALUE: -15
PIF_VALUE: 1
PIF_VALUE: 2
PIF_VALUE: 1
PIF_VALUE: -15
PIF_VALUE: 1
PIF_VALUE: -15
PIF_VALUE: 1
PIF_VALUE: -15
PIF_VALUE: 1
PIF_VALUE: 1
PIF_VALUE: -15
PIF_VALUE: -15
PIF_VALUE: 1
PIF_VALUE: -15
PIF_VALUE: 1
PIF_VALUE: -15
PIF_VALUE: 1

## 2019-09-17 ASSESSMENT — PAIN SCALES - GENERAL
PAINLEVEL_OUTOF10: 10
PAINLEVEL_OUTOF10: 0
PAINLEVEL_OUTOF10: 10
PAINLEVEL_OUTOF10: 9
PAINLEVEL_OUTOF10: 0
PAINLEVEL_OUTOF10: 0
PAINLEVEL_OUTOF10: 9
PAINLEVEL_OUTOF10: 0
PAINLEVEL_OUTOF10: 0

## 2019-09-17 ASSESSMENT — PAIN - FUNCTIONAL ASSESSMENT
PAIN_FUNCTIONAL_ASSESSMENT: 0-10
PAIN_FUNCTIONAL_ASSESSMENT: PREVENTS OR INTERFERES SOME ACTIVE ACTIVITIES AND ADLS

## 2019-09-17 ASSESSMENT — PAIN DESCRIPTION - FREQUENCY
FREQUENCY: CONTINUOUS
FREQUENCY: CONTINUOUS

## 2019-09-17 ASSESSMENT — PAIN DESCRIPTION - PAIN TYPE
TYPE: ACUTE PAIN;SURGICAL PAIN
TYPE: ACUTE PAIN;SURGICAL PAIN

## 2019-09-17 ASSESSMENT — PAIN DESCRIPTION - ORIENTATION
ORIENTATION: RIGHT
ORIENTATION: RIGHT

## 2019-09-17 ASSESSMENT — PAIN DESCRIPTION - LOCATION
LOCATION: KNEE
LOCATION: KNEE

## 2019-09-17 ASSESSMENT — PAIN DESCRIPTION - DESCRIPTORS
DESCRIPTORS: ACHING
DESCRIPTORS: ACHING
DESCRIPTORS: ACHING;CONSTANT;DULL

## 2019-09-17 NOTE — OP NOTE
place. The PCL retractor was placed behind the tibia. Collateral retractors were placed medially and laterally. The patient-specific tibial cutting guide was then secured to the proximal tibia, and the proximal tibial cut was then completed with soft tissue retractors in place. The patient was then brought out to extension. The knee was provisionally balanced using extension blocks. Alignment gustavo was used to confirm coronal alignment of the tibial cut. The 4-in-1 cutting block was then applied and the distal femoral cuts were then completed with soft tissue retractors in place. The medial and lateral menisci were then removed along with osteophytes from the posterior condyles. The appropriately-sized tibial trial was then pinned into place and the femoral trial was implanted. Polyethylene trial was then placed within the knee joint. The patient was able to reach full extension to gravity flexion to greater than 130 degrees. The knee was well-balanced in flexion, mid-flexion and extension. We then cut and prepared the patella, leaving a minimum thickness of 13 mm. The trial button was then placed. The patient was again taken through full range of motion. The knee was noted to be well-balanced. There was no patellar maltracking. The extremity was then exanguinated with an esmarch bandage and the tourniquet was inflated. We then completed preparation of the femur and tibia. The knee was copiously irrigated with sterile saline impregnated with Ancef and gentamicin antibiotic. Cement was then prepared on the back table under vacuum pressurization. The tibial, femoral and patellar components were then cemented into place, and the knee was held in extension with a polyethylene trial in place as the cement cured. After the cement hardened the knee was again taken through range of motion. The patient was noted to have full extension, flexion to 130 degrees.  The knee was well-balanced in the coronal and sagittal planes

## 2019-09-17 NOTE — CONSULTS
Davied Claude. Paola Flor M.D. Inpatient Consult Note 9/17/19    Patient:  Dontae Mendoza  MRN: [id number]    Reason for Consultation:  Post-operative medical management    Requesting Physician: Dr Mj Martinez    History Obtained From:  patient, electronic medical record  PCP: Alan Hagan DO    History of Present Illness: The patient is a 68 y.o. female who underwent an elective right total Knee replacement by Dr Mj Martinez  for degenerative arthritis and pain which was uncontrolled with outpatient conservative management. Post-op course thus far has been uncomplicated. Her pain is controlled. Past Medical History:        Diagnosis Date    CAD (coronary artery disease)     Diabetes mellitus ([facility] [address]     GERD (gastroesophageal reflux disease)     Hyperlipidemia     Hypertension        Past Surgical History:        Procedure Laterality Date    APPENDECTOMY      CARDIAC CATHETERIZATION      no stents    CARPAL TUNNEL RELEASE Bilateral 10/1999    CATARACT REMOVAL WITH IMPLANT Right 11/2/15    CHOLECYSTECTOMY      HYSTERECTOMY      TOTAL KNEE ARTHROPLASTY Right 09/17/2019    Dr. Mj Martinez       Medications Prior to Admission:    Prior to Admission medications    Medication Sig Start Date End Date Taking? Authorizing Provider   DULoxetine (CYMBALTA) 60 MG extended release capsule Take 60 mg by mouth daily   Yes Historical Provider, MD   FLUoxetine (PROZAC) 20 MG capsule Take 20 mg by mouth every other day TAKES ON Monday- WEDNESDAY-Friday. Yes Historical Provider, MD   rosuvastatin (CRESTOR) 40 MG tablet Take 40 mg by mouth every evening   Yes Historical Provider, MD   albuterol (PROVENTIL HFA;VENTOLIN HFA) 108 (90 BASE) MCG/ACT inhaler Inhale 2 puffs into the lungs every 6 hours as needed for Wheezing   Yes Historical Provider, MD   montelukast (SINGULAIR) 10 MG tablet Take 10 mg by mouth nightly.    Yes Historical Provider, MD   Ciclesonide (ALVESCO) 160 MCG/ACT AERS Inhale 1 puff into the lungs daily    Yes Historical abdominal pain, nausea, vomiting, diarrhea or constipation  Genitourinary:negative for dysuria, urgency or frequency  Musculoskeletal: positive for joint pain  Skin: negative for rashes or skin lesions  Neurological: negative for weakness, numbness or tingling    Objective:    Vitals:   Vitals:    09/17/19 1630   BP: (!) 98/49   Pulse: 56   Resp:    Temp:    SpO2: 94%     Weight: 156 lb (70.8 kg)   Height: 5' 4\" (162.6 cm)   -----------------------------------------------------------------  Exam:  GEN:   Awake, A+O x 3, no acute distress  NECK:  supple, no carotid bruit  PULM:  CTA bilaterally, no wheezing, rales or rhonchi  COR:  RRR without murmur   ABD:   Positive BS, soft, non-distended, non-tender  EXT:    Pedal pulses are intact. No calf tenderness  NEURO: Motor and sensory grossly intact  SKIN:   Incision appears well approximated  -----------------------------------------------------------------  Diagnostic Data:   · Reviewed    Assessment:      · Status post right total Knee arthroplasty  · Type 2 DM  · hypertension    Recommendation:     · Agree with post-op orders as written by Dr Sherri Mckeon  · Agree with current post-op DVT prophylaxis orders  · Pain control as ordered per Dr Sherri Mckeon  · Will follow medically.  Thank you    Randi Bah MD

## 2019-09-17 NOTE — FLOWSHEET NOTE
Incontinent a large amount of urine in bed. Continues with numbness from mid hips to feet. Complete bed change done.

## 2019-09-17 NOTE — ANESTHESIA PRE PROCEDURE
Department of Anesthesiology  Preprocedure Note       Name:  Bill Hoyt   Age:  68 y.o.  :  1945                                          MRN:  [id number]         Date:  2019      Surgeon: Severiano Limes):  Concepcion Alves MD    Procedure: KNEE TOTAL ARTHROPLASTY (Right )    Medications prior to admission:   Prior to Admission medications    Medication Sig Start Date End Date Taking? Authorizing Provider   DULoxetine (CYMBALTA) 60 MG extended release capsule Take 60 mg by mouth daily   Yes Historical Provider, MD   FLUoxetine (PROZAC) 20 MG capsule Take 20 mg by mouth every other day TAKES ON Monday- WEDNESDAY-Friday. Yes Historical Provider, MD   rosuvastatin (CRESTOR) 40 MG tablet Take 40 mg by mouth every evening   Yes Historical Provider, MD   albuterol (PROVENTIL HFA;VENTOLIN HFA) 108 (90 BASE) MCG/ACT inhaler Inhale 2 puffs into the lungs every 6 hours as needed for Wheezing   Yes Historical Provider, MD   montelukast (SINGULAIR) 10 MG tablet Take 10 mg by mouth nightly. Yes Historical Provider, MD   Ciclesonide (ALVESCO) 160 MCG/ACT AERS Inhale 1 puff into the lungs daily    Yes Historical Provider, MD   benzonatate (TESSALON) 200 MG capsule Take 200 mg by mouth 3 times daily as needed for Cough. Yes Historical Provider, MD   famotidine (PEPCID) 20 MG tablet Take 20 mg by mouth 2 times daily. Yes Historical Provider, MD   buPROPion (WELLBUTRIN SR) 150 MG SR tablet Take 150 mg by mouth daily    Yes Historical Provider, MD   metformin (GLUCOPHAGE) 500 MG tablet Take 500 mg by mouth daily (with breakfast).    Yes Historical Provider, MD   gabapentin (NEURONTIN) 400 MG capsule Take 400 mg by mouth every evening    Yes Historical Provider, MD   HYDROcodone-acetaminophen (NORCO)  MG per tablet Take 1 tablet by mouth every 6 hours as needed for Pain    Historical Provider, MD   Multiple Vitamins-Minerals (THERAPEUTIC MULTIVITAMIN-MINERALS) tablet Take 1 tablet by mouth daily    Historical Not Answered      Vital Signs (Current):   Vitals:    09/17/19 0700 09/17/19 1024   BP:  (!) 174/78   Pulse:  82   Resp:  18   Temp:  36.2 °C (97.2 °F)   TempSrc:  Temporal   SpO2:  99%   Weight: 156 lb 1.6 oz (70.8 kg) 156 lb (70.8 kg)   Height:  5' 4\" (1.626 m)                                              BP Readings from Last 3 Encounters:   09/17/19 (!) 174/78       NPO Status: Time of last liquid consumption: 0830(states \"sip of water with pill this morning\")                        Time of last solid consumption: 2330                        Date of last liquid consumption: 09/17/19                        Date of last solid food consumption: 09/16/19    BMI:   Wt Readings from Last 3 Encounters:   09/17/19 156 lb (70.8 kg)   08/29/19 156 lb 1.6 oz (70.8 kg)     Body mass index is 26.78 kg/m². CBC:   Lab Results   Component Value Date    WBC 7.2 08/29/2019    RBC 4.89 08/29/2019    HGB 13.8 08/29/2019    HCT 44.8 08/29/2019    MCV 91.6 08/29/2019    RDW 12.7 08/29/2019     08/29/2019       CMP:   Lab Results   Component Value Date     08/29/2019    K 4.7 08/29/2019     08/29/2019    CO2 28 08/29/2019    BUN 12 08/29/2019    CREATININE 0.98 08/29/2019    GFRAA >60 08/29/2019    LABGLOM 56 08/29/2019    GLUCOSE 122 08/29/2019    CALCIUM 9.7 08/29/2019       POC Tests: No results for input(s): POCGLU, POCNA, POCK, POCCL, POCBUN, POCHEMO, POCHCT in the last 72 hours.     Coags: No results found for: PROTIME, INR, APTT    HCG (If Applicable): No results found for: PREGTESTUR, PREGSERUM, HCG, HCGQUANT     ABGs: No results found for: PHART, PO2ART, WTE8DVN, TFR4DKV, BEART, I2MFIEUT     Type & Screen (If Applicable):  No results found for: LABABO, LABRH    Anesthesia Evaluation   no history of anesthetic complications:   Airway: Mallampati: II  TM distance: >3 FB   Neck ROM: full  Mouth opening: > = 3 FB Dental:    (+) upper dentures and lower dentures      Pulmonary:normal exam  breath sounds clear

## 2019-09-17 NOTE — PROGRESS NOTES
Department of Orthopedic Surgery  Progress Note    Subjective:  No complaints. Doing well postoperatively. Patient has no pain or movement in legs secondary to spinal anesthetic    Vitals  VITALS:  BP (!) 107/49   Pulse 57   Temp 97 °F (36.1 °C) (Temporal)   Resp 16   Ht 5' 4\" (1.626 m)   Wt 156 lb (70.8 kg)   SpO2 97%   BMI 26.78 kg/m²     PHYSICAL EXAM:  General: in no apparent distress, alert and oriented times 3  Right Lower Extremity  Incision:  dressing in place, clean, dry and intact  Neurologic:  Patient is unable to move or feel right leg secondary to residual spinal anesthetic  Vascular: present 2+ lower extremity. ASSESSMENT AND PLAN:  Post operative day 0 status post right total knee arthroplasty.     1:  Weight bearing as tolerated  2:  Continue Deep venous thrombosis prophylaxis   3:  Begin physical therapy  4:  D/C Plan:  Home  5:  Continue Pain Control

## 2019-09-18 LAB
ANION GAP SERPL CALCULATED.3IONS-SCNC: 14 MMOL/L (ref 9–17)
BUN BLDV-MCNC: 11 MG/DL (ref 8–23)
BUN/CREAT BLD: 12 (ref 9–20)
CALCIUM SERPL-MCNC: 9.3 MG/DL (ref 8.6–10.4)
CHLORIDE BLD-SCNC: 98 MMOL/L (ref 98–107)
CO2: 23 MMOL/L (ref 20–31)
CREAT SERPL-MCNC: 0.92 MG/DL (ref 0.5–0.9)
GFR AFRICAN AMERICAN: >60 ML/MIN
GFR NON-AFRICAN AMERICAN: >60 ML/MIN
GFR SERPL CREATININE-BSD FRML MDRD: ABNORMAL ML/MIN/{1.73_M2}
GFR SERPL CREATININE-BSD FRML MDRD: ABNORMAL ML/MIN/{1.73_M2}
GLUCOSE BLD-MCNC: 154 MG/DL (ref 70–99)
HCT VFR BLD CALC: 40 % (ref 36.3–47.1)
HEMOGLOBIN: 12.5 G/DL (ref 11.9–15.1)
POTASSIUM SERPL-SCNC: 4.7 MMOL/L (ref 3.7–5.3)
SODIUM BLD-SCNC: 135 MMOL/L (ref 135–144)

## 2019-09-18 PROCEDURE — 85014 HEMATOCRIT: CPT

## 2019-09-18 PROCEDURE — 94760 N-INVAS EAR/PLS OXIMETRY 1: CPT

## 2019-09-18 PROCEDURE — 97116 GAIT TRAINING THERAPY: CPT

## 2019-09-18 PROCEDURE — 85018 HEMOGLOBIN: CPT

## 2019-09-18 PROCEDURE — 94640 AIRWAY INHALATION TREATMENT: CPT

## 2019-09-18 PROCEDURE — 97161 PT EVAL LOW COMPLEX 20 MIN: CPT

## 2019-09-18 PROCEDURE — 97530 THERAPEUTIC ACTIVITIES: CPT

## 2019-09-18 PROCEDURE — 6370000000 HC RX 637 (ALT 250 FOR IP): Performed by: ORTHOPAEDIC SURGERY

## 2019-09-18 PROCEDURE — 6370000000 HC RX 637 (ALT 250 FOR IP): Performed by: PHYSICIAN ASSISTANT

## 2019-09-18 PROCEDURE — 80048 BASIC METABOLIC PNL TOTAL CA: CPT

## 2019-09-18 PROCEDURE — 6360000002 HC RX W HCPCS: Performed by: ORTHOPAEDIC SURGERY

## 2019-09-18 PROCEDURE — 97110 THERAPEUTIC EXERCISES: CPT

## 2019-09-18 PROCEDURE — 36415 COLL VENOUS BLD VENIPUNCTURE: CPT

## 2019-09-18 PROCEDURE — 2580000003 HC RX 258: Performed by: ORTHOPAEDIC SURGERY

## 2019-09-18 PROCEDURE — 1200000000 HC SEMI PRIVATE

## 2019-09-18 PROCEDURE — 97535 SELF CARE MNGMENT TRAINING: CPT

## 2019-09-18 RX ORDER — ACETAMINOPHEN 325 MG/1
650 TABLET ORAL EVERY 4 HOURS PRN
Status: DISCONTINUED | OUTPATIENT
Start: 2019-09-18 | End: 2019-09-20 | Stop reason: HOSPADM

## 2019-09-18 RX ORDER — AMLODIPINE BESYLATE 5 MG/1
5 TABLET ORAL DAILY
Status: DISCONTINUED | OUTPATIENT
Start: 2019-09-18 | End: 2019-09-20 | Stop reason: HOSPADM

## 2019-09-18 RX ORDER — HYDROCODONE BITARTRATE AND ACETAMINOPHEN 5; 325 MG/1; MG/1
1 TABLET ORAL EVERY 4 HOURS PRN
Qty: 42 TABLET | Refills: 0 | Status: SHIPPED | OUTPATIENT
Start: 2019-09-18 | End: 2019-09-19 | Stop reason: HOSPADM

## 2019-09-18 RX ADMIN — OXYCODONE HYDROCHLORIDE 10 MG: 5 TABLET ORAL at 17:13

## 2019-09-18 RX ADMIN — DULOXETINE HYDROCHLORIDE 60 MG: 60 CAPSULE, DELAYED RELEASE ORAL at 08:12

## 2019-09-18 RX ADMIN — APIXABAN 2.5 MG: 2.5 TABLET, FILM COATED ORAL at 08:11

## 2019-09-18 RX ADMIN — BUPROPION HYDROCHLORIDE 150 MG: 150 TABLET, EXTENDED RELEASE ORAL at 22:52

## 2019-09-18 RX ADMIN — OXYCODONE HYDROCHLORIDE 10 MG: 5 TABLET ORAL at 08:12

## 2019-09-18 RX ADMIN — OXYCODONE HYDROCHLORIDE 10 MG: 5 TABLET ORAL at 12:59

## 2019-09-18 RX ADMIN — METFORMIN HYDROCHLORIDE 500 MG: 500 TABLET ORAL at 08:12

## 2019-09-18 RX ADMIN — SODIUM CHLORIDE, POTASSIUM CHLORIDE, SODIUM LACTATE AND CALCIUM CHLORIDE: 600; 310; 30; 20 INJECTION, SOLUTION INTRAVENOUS at 18:29

## 2019-09-18 RX ADMIN — ALBUTEROL SULFATE 2 PUFF: 90 AEROSOL, METERED RESPIRATORY (INHALATION) at 10:27

## 2019-09-18 RX ADMIN — DOCUSATE SODIUM 100 MG: 100 CAPSULE, LIQUID FILLED ORAL at 08:12

## 2019-09-18 RX ADMIN — AMLODIPINE BESYLATE 5 MG: 5 TABLET ORAL at 15:09

## 2019-09-18 RX ADMIN — CEFAZOLIN 2 G: 1 INJECTION, POWDER, FOR SOLUTION INTRAMUSCULAR; INTRAVENOUS at 03:37

## 2019-09-18 RX ADMIN — ROSUVASTATIN CALCIUM 40 MG: 20 TABLET, FILM COATED ORAL at 17:13

## 2019-09-18 RX ADMIN — SODIUM CHLORIDE, POTASSIUM CHLORIDE, SODIUM LACTATE AND CALCIUM CHLORIDE: 600; 310; 30; 20 INJECTION, SOLUTION INTRAVENOUS at 08:11

## 2019-09-18 RX ADMIN — GABAPENTIN 400 MG: 400 CAPSULE ORAL at 17:13

## 2019-09-18 RX ADMIN — FAMOTIDINE 20 MG: 20 TABLET ORAL at 08:12

## 2019-09-18 RX ADMIN — FLUTICASONE PROPIONATE 2 PUFF: 110 AEROSOL, METERED RESPIRATORY (INHALATION) at 10:28

## 2019-09-18 RX ADMIN — MORPHINE SULFATE 4 MG: 4 INJECTION INTRAVENOUS at 05:05

## 2019-09-18 RX ADMIN — DOCUSATE SODIUM 100 MG: 100 CAPSULE, LIQUID FILLED ORAL at 21:25

## 2019-09-18 RX ADMIN — FLUOXETINE 20 MG: 20 CAPSULE ORAL at 08:12

## 2019-09-18 RX ADMIN — MORPHINE SULFATE 2 MG: 2 INJECTION, SOLUTION INTRAMUSCULAR; INTRAVENOUS at 18:58

## 2019-09-18 RX ADMIN — APIXABAN 2.5 MG: 2.5 TABLET, FILM COATED ORAL at 21:25

## 2019-09-18 RX ADMIN — FLUTICASONE PROPIONATE 2 PUFF: 110 AEROSOL, METERED RESPIRATORY (INHALATION) at 21:19

## 2019-09-18 RX ADMIN — ASPIRIN 81 MG 81 MG: 81 TABLET ORAL at 08:12

## 2019-09-18 RX ADMIN — MORPHINE SULFATE 4 MG: 4 INJECTION INTRAVENOUS at 15:05

## 2019-09-18 RX ADMIN — OXYCODONE HYDROCHLORIDE 10 MG: 5 TABLET ORAL at 00:44

## 2019-09-18 RX ADMIN — FAMOTIDINE 20 MG: 20 TABLET ORAL at 21:25

## 2019-09-18 RX ADMIN — MULTIPLE VITAMINS W/ MINERALS TAB 1 TABLET: TAB at 08:12

## 2019-09-18 RX ADMIN — MONTELUKAST SODIUM 10 MG: 10 TABLET, FILM COATED ORAL at 21:25

## 2019-09-18 ASSESSMENT — PAIN DESCRIPTION - ORIENTATION
ORIENTATION: RIGHT

## 2019-09-18 ASSESSMENT — PAIN DESCRIPTION - LOCATION
LOCATION: KNEE

## 2019-09-18 ASSESSMENT — PAIN DESCRIPTION - DESCRIPTORS
DESCRIPTORS: ACHING

## 2019-09-18 ASSESSMENT — PAIN SCALES - GENERAL
PAINLEVEL_OUTOF10: 10
PAINLEVEL_OUTOF10: 8
PAINLEVEL_OUTOF10: 6
PAINLEVEL_OUTOF10: 0
PAINLEVEL_OUTOF10: 9
PAINLEVEL_OUTOF10: 9
PAINLEVEL_OUTOF10: 0
PAINLEVEL_OUTOF10: 0
PAINLEVEL_OUTOF10: 10
PAINLEVEL_OUTOF10: 10
PAINLEVEL_OUTOF10: 9
PAINLEVEL_OUTOF10: 9
PAINLEVEL_OUTOF10: 7
PAINLEVEL_OUTOF10: 9
PAINLEVEL_OUTOF10: 10

## 2019-09-18 ASSESSMENT — PAIN DESCRIPTION - PAIN TYPE
TYPE: SURGICAL PAIN
TYPE: ACUTE PAIN;SURGICAL PAIN
TYPE: SURGICAL PAIN
TYPE: ACUTE PAIN;SURGICAL PAIN
TYPE: ACUTE PAIN;SURGICAL PAIN

## 2019-09-18 ASSESSMENT — PAIN DESCRIPTION - FREQUENCY
FREQUENCY: CONTINUOUS

## 2019-09-18 ASSESSMENT — PAIN - FUNCTIONAL ASSESSMENT
PAIN_FUNCTIONAL_ASSESSMENT: PREVENTS OR INTERFERES SOME ACTIVE ACTIVITIES AND ADLS
PAIN_FUNCTIONAL_ASSESSMENT: PREVENTS OR INTERFERES SOME ACTIVE ACTIVITIES AND ADLS

## 2019-09-18 ASSESSMENT — PAIN DESCRIPTION - ONSET: ONSET: ON-GOING

## 2019-09-18 NOTE — PROGRESS NOTES
Department of Orthopedic Surgery  Progress Note    Subjective: Overall, doing well postoperatively. Has had increase in pain last night after spinal wore off.   pain is perceived as severe 8 out of 10 at rest.  Pain increased to 10 out of 10 with movement. Has been taking oxycodone and morphine for pain. Pain localized to knee. Worse with increased movement and activity. Better with medications. Was unable to do PT yesterday d/t having spinal anesthetic. No numbness/tingling. No calf pain or swelling. Vitals  VITALS:  BP (!) 163/95   Pulse 76   Temp 98.6 °F (37 °C) (Temporal)   Resp 16   Ht 5' 4\" (1.626 m)   Wt 160 lb 6.4 oz (72.8 kg)   SpO2 93%   BMI 27.53 kg/m²     PHYSICAL EXAM:  General: in no apparent distress, non-toxic, alert and oriented times 3  Right Lower Extremity  Incision:  Aquacel dressing in place, clean, dry and intact  Neurologic:  Moving lower extremity as appropriate following sugery. Able to dorsiflex and plantar flex foot/ankle. Intact to gross sensation and touch in lower extremity. Vascular: present 2+ lower extremity. Calf soft, non-tender. No evidence of DVT seen on physical exam.  Abnormal Exam findings:  none    LABS:  Hgb:    Lab Results   Component Value Date    HGB 13.8 08/29/2019     Results of H/H from 9/18/19 pending    ASSESSMENT AND PLAN:  Post operative day 1 status post right total knee arthroplasty. 1:  Weight bearing as tolerated  2:  Continue Deep venous thrombosis prophylaxis; will discharge on Eliquis 2.5 mg b.i.d.  3:  Continue physical therapy. Has outpatient PT scheduled for 9/19/19  4:  D/C Plan:  Home once pain adequately controlled  5:  Continue Pain Control.   Will discharge on Norco 5-325 mg every 4 hours prn pain   6: Keep 2 week f/u with Dr. Mendel Abed

## 2019-09-18 NOTE — PROGRESS NOTES
unsupported on edge of bed with G balance, no LOB. Patient performed hygiene/grooming tasks, UB bathing task, and UB dressing task with Set-up (A) while sitting edge of bed. Patient performed sitting to supine/sidelying with MI post ADLs. Patient left in bed with call light in reach.       Plan   Plan  Times per week: 7  Times per day: Daily  Current Treatment Recommendations: Strengthening, Functional Mobility Training, Patient/Caregiver Education & Training, Endurance Training, Balance Training, Safety Education & Training, Self-Care / ADL  Plan Comment: ther ex, ther act, self care      Goals  Short term goals  Time Frame for Short term goals: 10 days  Short term goal 1: Patient will perform TB ADLs with MI using AE as needed/trained in order to return to PLOF  Short term goal 2: Patient will perform functional mobility during ADLs with MI using LRAD in order to return to PLOF  Short term goal 3: Patient will perform sinkside ADLs while standing x 5 min intervals w/o LOB and with good safety in order to increase participation with ADLs  Short term goal 4: Patient will demonstrate good understanding of D/C without cues/prompts to ensure safe return home  Patient Goals   Patient goals : to go home         Therapy Time   Individual Concurrent Group Co-treatment   Time In 1054         Time Out 1118         Minutes [place], [place]

## 2019-09-19 ENCOUNTER — APPOINTMENT (OUTPATIENT)
Dept: VASCULAR LAB | Age: 74
DRG: 470 | End: 2019-09-19
Attending: ORTHOPAEDIC SURGERY
Payer: MEDICARE

## 2019-09-19 LAB
HCT VFR BLD CALC: 37.5 % (ref 36.3–47.1)
HEMOGLOBIN: 11.9 G/DL (ref 11.9–15.1)

## 2019-09-19 PROCEDURE — 2580000003 HC RX 258: Performed by: ORTHOPAEDIC SURGERY

## 2019-09-19 PROCEDURE — 6370000000 HC RX 637 (ALT 250 FOR IP): Performed by: PHYSICIAN ASSISTANT

## 2019-09-19 PROCEDURE — 6360000002 HC RX W HCPCS: Performed by: PHYSICIAN ASSISTANT

## 2019-09-19 PROCEDURE — 97530 THERAPEUTIC ACTIVITIES: CPT

## 2019-09-19 PROCEDURE — 85018 HEMOGLOBIN: CPT

## 2019-09-19 PROCEDURE — 97116 GAIT TRAINING THERAPY: CPT

## 2019-09-19 PROCEDURE — 36415 COLL VENOUS BLD VENIPUNCTURE: CPT

## 2019-09-19 PROCEDURE — 1200000000 HC SEMI PRIVATE

## 2019-09-19 PROCEDURE — 94640 AIRWAY INHALATION TREATMENT: CPT

## 2019-09-19 PROCEDURE — 85014 HEMATOCRIT: CPT

## 2019-09-19 PROCEDURE — 97110 THERAPEUTIC EXERCISES: CPT

## 2019-09-19 PROCEDURE — 6370000000 HC RX 637 (ALT 250 FOR IP): Performed by: ORTHOPAEDIC SURGERY

## 2019-09-19 PROCEDURE — 93970 EXTREMITY STUDY: CPT

## 2019-09-19 RX ORDER — ASPIRIN 325 MG
325 TABLET, DELAYED RELEASE (ENTERIC COATED) ORAL DAILY
Qty: 30 TABLET | Refills: 3 | Status: SHIPPED | OUTPATIENT
Start: 2019-09-19 | End: 2020-09-18

## 2019-09-19 RX ORDER — KETOROLAC TROMETHAMINE 15 MG/ML
15 INJECTION, SOLUTION INTRAMUSCULAR; INTRAVENOUS EVERY 6 HOURS
Status: DISCONTINUED | OUTPATIENT
Start: 2019-09-19 | End: 2019-09-20 | Stop reason: HOSPADM

## 2019-09-19 RX ORDER — KETOROLAC TROMETHAMINE 10 MG/1
20 TABLET, FILM COATED ORAL ONCE
Status: DISCONTINUED | OUTPATIENT
Start: 2019-09-19 | End: 2019-09-19

## 2019-09-19 RX ORDER — KETOROLAC TROMETHAMINE 30 MG/ML
30 INJECTION, SOLUTION INTRAMUSCULAR; INTRAVENOUS ONCE
Status: COMPLETED | OUTPATIENT
Start: 2019-09-19 | End: 2019-09-19

## 2019-09-19 RX ORDER — OXYCODONE HYDROCHLORIDE AND ACETAMINOPHEN 5; 325 MG/1; MG/1
1 TABLET ORAL EVERY 4 HOURS PRN
Qty: 40 TABLET | Refills: 0 | Status: SHIPPED | OUTPATIENT
Start: 2019-09-19 | End: 2019-09-26

## 2019-09-19 RX ORDER — AMLODIPINE BESYLATE 5 MG/1
5 TABLET ORAL DAILY
Qty: 30 TABLET | Refills: 0 | Status: SHIPPED | OUTPATIENT
Start: 2019-09-19

## 2019-09-19 RX ADMIN — DULOXETINE HYDROCHLORIDE 60 MG: 60 CAPSULE, DELAYED RELEASE ORAL at 09:16

## 2019-09-19 RX ADMIN — DOCUSATE SODIUM 100 MG: 100 CAPSULE, LIQUID FILLED ORAL at 09:15

## 2019-09-19 RX ADMIN — MONTELUKAST SODIUM 10 MG: 10 TABLET, FILM COATED ORAL at 21:10

## 2019-09-19 RX ADMIN — FAMOTIDINE 20 MG: 20 TABLET ORAL at 09:16

## 2019-09-19 RX ADMIN — ALBUTEROL SULFATE 2 PUFF: 90 AEROSOL, METERED RESPIRATORY (INHALATION) at 20:12

## 2019-09-19 RX ADMIN — FLUTICASONE PROPIONATE 2 PUFF: 110 AEROSOL, METERED RESPIRATORY (INHALATION) at 20:12

## 2019-09-19 RX ADMIN — BUPROPION HYDROCHLORIDE 150 MG: 150 TABLET, EXTENDED RELEASE ORAL at 23:59

## 2019-09-19 RX ADMIN — GABAPENTIN 400 MG: 400 CAPSULE ORAL at 17:37

## 2019-09-19 RX ADMIN — FLUTICASONE PROPIONATE 2 PUFF: 110 AEROSOL, METERED RESPIRATORY (INHALATION) at 10:32

## 2019-09-19 RX ADMIN — ASPIRIN 81 MG 81 MG: 81 TABLET ORAL at 09:16

## 2019-09-19 RX ADMIN — ROSUVASTATIN CALCIUM 40 MG: 20 TABLET, FILM COATED ORAL at 17:37

## 2019-09-19 RX ADMIN — FAMOTIDINE 20 MG: 20 TABLET ORAL at 21:10

## 2019-09-19 RX ADMIN — DOCUSATE SODIUM 100 MG: 100 CAPSULE, LIQUID FILLED ORAL at 21:10

## 2019-09-19 RX ADMIN — APIXABAN 2.5 MG: 2.5 TABLET, FILM COATED ORAL at 09:16

## 2019-09-19 RX ADMIN — MULTIPLE VITAMINS W/ MINERALS TAB 1 TABLET: TAB at 09:16

## 2019-09-19 RX ADMIN — METFORMIN HYDROCHLORIDE 500 MG: 500 TABLET ORAL at 09:24

## 2019-09-19 RX ADMIN — ALBUTEROL SULFATE 2 PUFF: 90 AEROSOL, METERED RESPIRATORY (INHALATION) at 10:32

## 2019-09-19 RX ADMIN — APIXABAN 2.5 MG: 2.5 TABLET, FILM COATED ORAL at 21:10

## 2019-09-19 RX ADMIN — KETOROLAC TROMETHAMINE 15 MG: 15 INJECTION, SOLUTION INTRAMUSCULAR; INTRAVENOUS at 15:18

## 2019-09-19 RX ADMIN — AMLODIPINE BESYLATE 5 MG: 5 TABLET ORAL at 09:15

## 2019-09-19 RX ADMIN — SODIUM CHLORIDE, POTASSIUM CHLORIDE, SODIUM LACTATE AND CALCIUM CHLORIDE: 600; 310; 30; 20 INJECTION, SOLUTION INTRAVENOUS at 15:18

## 2019-09-19 RX ADMIN — SODIUM CHLORIDE, POTASSIUM CHLORIDE, SODIUM LACTATE AND CALCIUM CHLORIDE: 600; 310; 30; 20 INJECTION, SOLUTION INTRAVENOUS at 04:15

## 2019-09-19 RX ADMIN — KETOROLAC TROMETHAMINE 30 MG: 30 INJECTION, SOLUTION INTRAMUSCULAR at 09:15

## 2019-09-19 ASSESSMENT — PAIN DESCRIPTION - LOCATION
LOCATION: KNEE

## 2019-09-19 ASSESSMENT — PAIN SCALES - GENERAL
PAINLEVEL_OUTOF10: 6
PAINLEVEL_OUTOF10: 6
PAINLEVEL_OUTOF10: 8
PAINLEVEL_OUTOF10: 8
PAINLEVEL_OUTOF10: 4

## 2019-09-19 ASSESSMENT — PAIN DESCRIPTION - DESCRIPTORS
DESCRIPTORS: ACHING

## 2019-09-19 ASSESSMENT — PAIN DESCRIPTION - PAIN TYPE
TYPE: SURGICAL PAIN
TYPE: ACUTE PAIN;SURGICAL PAIN

## 2019-09-19 ASSESSMENT — PAIN DESCRIPTION - FREQUENCY
FREQUENCY: CONTINUOUS

## 2019-09-19 ASSESSMENT — PAIN - FUNCTIONAL ASSESSMENT: PAIN_FUNCTIONAL_ASSESSMENT: ACTIVITIES ARE NOT PREVENTED

## 2019-09-19 ASSESSMENT — PAIN DESCRIPTION - ORIENTATION
ORIENTATION: RIGHT;OUTER

## 2019-09-19 NOTE — PROGRESS NOTES
Spoke to patient's sister at length when she was visiting. Sister, Hannah Ya expressed concern about patient receiving IV morphine and requesting patient be given PO prn pain medication first.  Explained to sister that patient did receive PO pain medication last evening first then the IV medication. Sister verbalized understanding. Sister then voiced concern about patient going home with [facility] prescription when only on oxycodone and Morphine in the hospital.  Sister states wanting patient to have [address] here in hospital to see how patient does before going home for pain control. Writer relayed this information to nurse caring for patient and advised to get norco ordered due to sister wanting patient having norco as first medication to be given for pain.   Continue to monitor

## 2019-09-19 NOTE — PROGRESS NOTES
Dr. Claudio Greene consulted regarding PT's HTN. Dr. Claudio Greene said,\" I'll look at her B/P during rounds.

## 2019-09-19 NOTE — PROGRESS NOTES
Physical Therapy  Facility/Department: [facility] AT THE [facility] MED SURG  Daily Treatment Note  NAME: Mary Jane Ivory  : 1945  MRN: [id number]    Date of Service: 2019    Discharge Recommendations:  Continue to assess pending progress, Patient would benefit from continued therapy after discharge        Assessment   Treatment Diagnosis: difficulty walking, right knee pain  Prognosis: Good  PT Education: Home Exercise Program;Gait Training;Transfer Training  Patient Education: Patient with good understanding with training listed above. REQUIRES PT FOLLOW UP: Yes  Activity Tolerance  Activity Tolerance: Patient limited by pain     Patient Diagnosis(es): The encounter diagnosis was Post-op pain. has a past medical history of CAD (coronary artery disease), Diabetes mellitus ([facility] [address] GERD (gastroesophageal reflux disease), Hyperlipidemia, and Hypertension. has a past surgical history that includes Appendectomy; Cholecystectomy; Hysterectomy; Cataract removal with implant (Right, 11/2/15); Carpal tunnel release (Bilateral, 10/1999); Cardiac catheterization; Total knee arthroplasty (Right, 2019); and Total knee arthroplasty (Right, 2019). Restrictions  Restrictions/Precautions  Restrictions/Precautions: General Precautions, Fall Risk  Lower Extremity Weight Bearing Restrictions  Right Lower Extremity Weight Bearing: Weight Bearing As Tolerated  Subjective   General  Chart Reviewed: Yes  Response To Previous Treatment: Patient with no complaints from previous session. Family / Caregiver Present: No  Subjective  Subjective: Patient reports 9/10 pain at present. Nurse aware. Orientation  Orientation  Overall Orientation Status: Within Functional Limits  Cognition      Objective   Bed mobility  Supine to Sit: Modified independent  Sit to Supine: Modified independent  Scooting: Modified independent  Comment: Patient requires extra time to complete task.   Transfers  Sit to Stand: Contact guard

## 2019-09-19 NOTE — PROGRESS NOTES
Department of Orthopedic Surgery  Progress Note    Subjective:  Post-operative course complicated by severe, uncontrolled pain and HTN. Has been receiving IV morphine, Tylenol and oxycodone which has been inadequately controlling her pain. Pain is localized to knee. Pain is constant. Currently pain is 9 out of 10 at rest.  Pain increases to 10 out of 10 with movement. No numbness/tingling. No calf pain or swelling. Vitals  VITALS:  BP (!) 168/87   Pulse 87   Temp 97.3 °F (36.3 °C) (Temporal)   Resp 16   Ht 5' 4\" (1.626 m)   Wt 164 lb (74.4 kg)   SpO2 95%   BMI 28.15 kg/m²     PHYSICAL EXAM:  General: Patient reports pain as 9 out of 10 but appears to be resting in chair comfortably, well developed and well nourished, alert and oriented times 3  Right Lower Extremity  Incision:  Aquacel dressing in place, clean, dry and intact  Neurologic:  Moving lower extremity as appropriate following sugery. Able to dorsiflex and plantar flex foot/ankle. Intact to gross sensation and touch in lower extremity. Vascular: present 2+ lower extremity. Calf soft, non-tender. No evidence of DVT seen on physical exam.  Abnormal Exam findings:  none    LABS:  Hgb:    Lab Results   Component Value Date    HGB 12.5 09/18/2019       ASSESSMENT AND PLAN:  Post operative day 2 status post right total knee arthroplasty. 1:  Weight bearing as tolerated  2:  Continue Deep venous thrombosis prophylaxis; will discharge on Aspirin 325 mg once/day d/t costs of Eliquis  3:  Continue physical therapy  4:  D/C Plan:  Home with help of sister  5:  Continue Pain Control. Pain inadequately controlled with oxycodone, tylenol and IV morphine. Will attempt to start on Toradol 20 mg initially and then 10 every 6 hours as needed in addition to Percocet 5-325 mg every 4 hours. Prescription exceeds over 30 MME d/t pain being inadequately controlled by lesser doses, IV morphine and OTC analgesics.   Reviewed rx with collaborator who

## 2019-09-19 NOTE — PROGRESS NOTES
Patient laying calmly in chair with eyes closed with intermittent snoring with no s/s of pain. Upon waking patient to recheck b/p; patient states that her pain remains the same as p/t receiving toradol. Will continue to monitor for need of pain management.

## 2019-09-20 VITALS
HEIGHT: 64 IN | OXYGEN SATURATION: 92 % | TEMPERATURE: 97.5 F | BODY MASS INDEX: 28.12 KG/M2 | HEART RATE: 88 BPM | DIASTOLIC BLOOD PRESSURE: 59 MMHG | WEIGHT: 164.7 LBS | RESPIRATION RATE: 18 BRPM | SYSTOLIC BLOOD PRESSURE: 143 MMHG

## 2019-09-20 LAB
HCT VFR BLD CALC: 32.1 % (ref 36.3–47.1)
HEMOGLOBIN: 10.5 G/DL (ref 11.9–15.1)

## 2019-09-20 PROCEDURE — 6370000000 HC RX 637 (ALT 250 FOR IP): Performed by: PHYSICIAN ASSISTANT

## 2019-09-20 PROCEDURE — 2580000003 HC RX 258: Performed by: ORTHOPAEDIC SURGERY

## 2019-09-20 PROCEDURE — 97110 THERAPEUTIC EXERCISES: CPT

## 2019-09-20 PROCEDURE — 97116 GAIT TRAINING THERAPY: CPT

## 2019-09-20 PROCEDURE — 85018 HEMOGLOBIN: CPT

## 2019-09-20 PROCEDURE — 94640 AIRWAY INHALATION TREATMENT: CPT

## 2019-09-20 PROCEDURE — 85014 HEMATOCRIT: CPT

## 2019-09-20 PROCEDURE — 36415 COLL VENOUS BLD VENIPUNCTURE: CPT

## 2019-09-20 PROCEDURE — 6370000000 HC RX 637 (ALT 250 FOR IP): Performed by: ORTHOPAEDIC SURGERY

## 2019-09-20 RX ADMIN — SODIUM CHLORIDE, POTASSIUM CHLORIDE, SODIUM LACTATE AND CALCIUM CHLORIDE: 600; 310; 30; 20 INJECTION, SOLUTION INTRAVENOUS at 00:05

## 2019-09-20 RX ADMIN — ASPIRIN 81 MG 81 MG: 81 TABLET ORAL at 08:22

## 2019-09-20 RX ADMIN — FLUTICASONE PROPIONATE 2 PUFF: 110 AEROSOL, METERED RESPIRATORY (INHALATION) at 10:16

## 2019-09-20 RX ADMIN — DOCUSATE SODIUM 100 MG: 100 CAPSULE, LIQUID FILLED ORAL at 08:22

## 2019-09-20 RX ADMIN — MULTIPLE VITAMINS W/ MINERALS TAB 1 TABLET: TAB at 08:22

## 2019-09-20 RX ADMIN — APIXABAN 2.5 MG: 2.5 TABLET, FILM COATED ORAL at 08:22

## 2019-09-20 RX ADMIN — OXYCODONE HYDROCHLORIDE 5 MG: 5 TABLET ORAL at 10:46

## 2019-09-20 RX ADMIN — DULOXETINE HYDROCHLORIDE 60 MG: 60 CAPSULE, DELAYED RELEASE ORAL at 08:22

## 2019-09-20 RX ADMIN — AMLODIPINE BESYLATE 5 MG: 5 TABLET ORAL at 08:22

## 2019-09-20 RX ADMIN — FAMOTIDINE 20 MG: 20 TABLET ORAL at 08:22

## 2019-09-20 RX ADMIN — METFORMIN HYDROCHLORIDE 500 MG: 500 TABLET ORAL at 08:22

## 2019-09-20 RX ADMIN — FLUOXETINE 20 MG: 20 CAPSULE ORAL at 08:22

## 2019-09-20 RX ADMIN — ALBUTEROL SULFATE 2 PUFF: 90 AEROSOL, METERED RESPIRATORY (INHALATION) at 10:16

## 2019-09-20 ASSESSMENT — PAIN DESCRIPTION - ORIENTATION
ORIENTATION: RIGHT
ORIENTATION: RIGHT;OUTER

## 2019-09-20 ASSESSMENT — PAIN - FUNCTIONAL ASSESSMENT: PAIN_FUNCTIONAL_ASSESSMENT: ACTIVITIES ARE NOT PREVENTED

## 2019-09-20 ASSESSMENT — PAIN SCALES - GENERAL
PAINLEVEL_OUTOF10: 6
PAINLEVEL_OUTOF10: 5
PAINLEVEL_OUTOF10: 6
PAINLEVEL_OUTOF10: 6

## 2019-09-20 ASSESSMENT — PAIN DESCRIPTION - DESCRIPTORS: DESCRIPTORS: ACHING

## 2019-09-20 ASSESSMENT — PAIN DESCRIPTION - LOCATION
LOCATION: KNEE
LOCATION: KNEE

## 2019-09-20 ASSESSMENT — PAIN DESCRIPTION - FREQUENCY: FREQUENCY: CONTINUOUS

## 2019-09-20 ASSESSMENT — PAIN DESCRIPTION - PAIN TYPE
TYPE: SURGICAL PAIN
TYPE: ACUTE PAIN;SURGICAL PAIN

## 2019-09-20 NOTE — PROGRESS NOTES
Department of Orthopedic Surgery  Progress Note    Subjective:  Postoperative course complicated by severe uncontrolled pain and HTN. Patient reports that pain has been improving with addition of IV toradol. Patient reports the pain as 6 out of 10. Pain localized to knee. Pain worse with movement and increased weight bearing. Better with oxycodone, Tylenol and toradol. Therapy reported that patient did well with this AM's session. No numbness/tingling. No significant calf pain/tenderness. Vitals  VITALS:  BP (!) 141/71   Pulse 92   Temp 98.9 °F (37.2 °C) (Temporal)   Resp 16   Ht 5' 4\" (1.626 m)   Wt 164 lb 11.2 oz (74.7 kg)   SpO2 94%   BMI 28.27 kg/m²     PHYSICAL EXAM:  General: in no apparent distress, alert and oriented times 3  Right Lower Extremity  Incision:  Aquacel dressing in place, clean, dry and intact  Neurologic:  Moving lower extremity as appropriate following sugery. Able to dorsiflex and plantar flex foot/ankle. Intact to gross sensation and touch in lower extremity. Vascular: present 2+ lower extremity. Calf soft, non-tender. No evidence of DVT seen on physical exam.  Abnormal Exam findings:  none    LABS:  Hgb:    Lab Results   Component Value Date    HGB 10.5 09/20/2019         ASSESSMENT AND PLAN:  Post operative day 3 status post right total knee arthroplasty. 1:  Weight bearing as tolerated  2:  Continue Deep venous thrombosis prophylaxis. Will discharge on Aspirin 325 mg daily  3:  Continue physical therapy. Will schedule with [facility]  4:  D/C Plan:  Home with sister and home health  5:  Continue Pain Control. Will d/c percocet 5-325 mg every 4 hours as needed and toradol 10 mg every 6 hours. Percocet prescription exceeds over 30 MME d/t pain being inadequately controlled by lesser doses, IV morphine and OTC analgesics.      6:  2 week f/u with Dr. Shane Saundres

## 2019-09-20 NOTE — PROGRESS NOTES
Physical Therapy  Facility/Department: [facility] AT THE [facility] MED SURG  Daily Treatment Note  NAME: Yudi Perez  : 1945  MRN: [id number]    Date of Service: 2019    Discharge Recommendations:  Continue to assess pending progress, Patient would benefit from continued therapy after discharge        Assessment      PT Education: Gait Training;Transfer Training  Patient Education: Patient with good understanding with training listed above. REQUIRES PT FOLLOW UP: Yes  Activity Tolerance  Activity Tolerance: Patient Tolerated treatment well     Patient Diagnosis(es): The primary encounter diagnosis was Post-op pain. Diagnoses of Primary osteoarthritis of right knee and Essential hypertension were also pertinent to this visit. has a past medical history of CAD (coronary artery disease), Diabetes mellitus ([address] GERD (gastroesophageal reflux disease), Hyperlipidemia, and Hypertension. has a past surgical history that includes Appendectomy; Cholecystectomy; Hysterectomy; Cataract removal with implant (Right, 11/2/15); Carpal tunnel release (Bilateral, 10/1999); Cardiac catheterization; Total knee arthroplasty (Right, 2019); and Total knee arthroplasty (Right, 2019). Restrictions  Restrictions/Precautions  Restrictions/Precautions: General Precautions, Fall Risk  Lower Extremity Weight Bearing Restrictions  Right Lower Extremity Weight Bearing: Weight Bearing As Tolerated  Subjective   General  Chart Reviewed: Yes  Response To Previous Treatment: Patient with no complaints from previous session. Subjective  Subjective: Pt reported 6/10 R knee pain. Stated that she slept well last night.            Orientation  Orientation  Overall Orientation Status: Within Functional Limits  Cognition      Objective   Bed mobility  Rolling to Right: Modified independent  Supine to Sit: Modified independent  Scooting: Modified independent  Transfers  Sit to Stand: Contact guard assistance  Stand to sit: Contact

## 2021-12-27 ENCOUNTER — HOSPITAL ENCOUNTER (OUTPATIENT)
Dept: PHYSICAL THERAPY | Age: 76
Setting detail: THERAPIES SERIES
Discharge: HOME | End: 2021-12-27
Payer: MEDICARE

## 2021-12-27 PROCEDURE — 97110 THERAPEUTIC EXERCISES: CPT

## 2021-12-27 PROCEDURE — 97162 PT EVAL MOD COMPLEX 30 MIN: CPT

## 2021-12-27 ASSESSMENT — PAIN SCALES - QUEBEC BACK PAIN DISABILITY SCALE
LIFT AND CARRY A HEAVY SUITCASE: 5
WALK SEVERAL KILOMETERS  OR MILES: 5
PUT ON SOCKS OR PANYHOSE: 3
QUEBEC DISABILITY INDEX: 60-79%
TAKE FOOD OUT OF THE REFRIGERATOR: 2
PULL OR PUSH HEAVY DOORS: 4
THROW A BALL: 5
MAKE YOUR BED: 3
WALK A FEW BLOCKS OR 300 TO 400M: 5
QUEBEC CMS MODIFIER: CL
STAND UP FOR 20 TO 30 MINUTES: 4
CLIMB ONE FLIGHT OF STAIRS: 4
TURN OVER IN BED: 3
RUN ONE BLOCK OR 100M: 5
CARRY TWO BAGS OF GROCERIES: 4
SIT IN A CHAIR FOR SEVERAL HOURS: 1
TOTAL SCORE: 74
SLEEP THROUGH THE NIGHT: 3
RIDE IN A CAR: 3
MOVE A CHAIR: 4
REACH UP TO HIGH SHELVES: 3
GET OUT OF BED: 4
BEND OVER TO CLEAN THE BATHTUB: 4

## 2021-12-29 ENCOUNTER — HOSPITAL ENCOUNTER (OUTPATIENT)
Dept: PHYSICAL THERAPY | Age: 76
Setting detail: THERAPIES SERIES
Discharge: HOME | End: 2021-12-29
Payer: MEDICARE

## 2021-12-29 PROCEDURE — 97113 AQUATIC THERAPY/EXERCISES: CPT

## 2021-12-29 NOTE — PROGRESS NOTES
Phone: [facility]           Fax: [phone]                           Outpatient Physical Therapy                                                                            Daily Note    Patient: Jeromy Spivey : 1945  CSN #: [id number]   Referring Practitioner:  Larry Muñiz DO    Referral Date : 12/15/21     Date: 2021    Diagnosis: Lumbar scoliosis, M41.9; Other intervertebral disc degeneration, lumbar region, M51.36  Treatment Diagnosis: Low back pain    Onset Date: 12/15/21  PT Insurance Information: Medicare  Total # of Visits Approved: 18 Per Physician Order  Total # of Visits to Date: 2  No Show: 0  Canceled Appointment: 0      Pre-Treatment Pain: 9/10  Subjective: Pt reports 9/10 low back tristian today. Exercises:  Exercise 1: **HEP - sitting trunk flexion, LTR  Exercise 2: Pool: water walking x3 laps fwd/lat/retro  Exercise 3: Pool:  step stretches 10\" x5  Exercise 4: Pool:  sink ex in deep x10  Exercise 5: Pool: SKTC 5\"x5  Exercise 6: Pool:  Blue float push down x10 ea side  Exercise 7: Pool:  Blue bar for abs x15  Exercise 10: AQ:  deep water bike and hang x5 min    Assessment  Assessment: Progressed pt with aquatic exercises within tolerance. Pt with no c/o of increased pain. Pt reports compliance with HEP, although she reports increased discomfort with seated flexion - educated on proper form with good understanding. WIll cont with aquatics to increased strength/ROM in an unloaded environment. Activity Tolerance  Activity Tolerance: Patient Tolerated treatment well    Patient Education  Ex technique and aquatic rationale. HEP  Pt verbalized/demonstrated good understanding:     [x] Yes         [] No, pt required further clarification.        Post Treatment Pain:  Not stated      Plan  Times per week: 2  Plan weeks: 6      Goals  (Total # of Visits to Date: 2)      Short term goals  Time Frame for Short term goals: 3 weeks  Short term goal 1: Pt to be instructed in home program. -met  Short term goal 2: Pt to begin core strengthening ex for improved lumbar stability. -met    Long term goals  Time Frame for Long term goals : 6 weeks  Long term goal 1: Pt to report independence and compliance with home program.  Long term goal 2: Pt to complete TUG without AD in less than 17.0 seconds indicating improved gait and stability. Long term goal 3: Pt to achieve 4/5 strength bilateral hips to assist with functional tasks. Long term goal 4: Pt to score no greater than 63 on [place] Back Pain Disability Scale indicating improved quality of life.     Minutes Tracking:  Time In: 0289  Time Out: [address]  Minutes: [address] [facility], [facility]     Date: 12/29/2021

## 2021-12-29 NOTE — PROGRESS NOTES
Phone: [facility]          Fax: [phone]                      Outpatient Physical Therapy                                                                      Evaluation  Date: 2021  Patient: Tamir Wu  : 1945  CSN #: [id number]  Referring Practitioner: Sukhdev Schultz DO    Referral Date : 12/15/21     Medical Diagnosis: Lumbar scoliosis, M41.9; Other intervertebral disc degeneration, lumbar region, M51.36    Treatment Diagnosis: Low back pain  Onset Date: 12/15/21  PT Insurance Information: Medicare  Total # of Visits Approved: 18   Total # of Visits to Date: 1  No Show: 0  Canceled Appointment: 0     Subjective  Subjective: Pt states she has been having low back problems for quite some time. Has done pool therapy in the past. Has tried injections in the past but did not help much. Now getting to the point where she can only stand for short periods of time. Can barely stand to do dishes or long enough to get to her mailbox. Has to lean on cart when getting groceries. Pt states pain is localized to low back but she also gets joint pain in both knees.   Additional Pertinent Hx: HTN, OA, DM, TKA 2019    Objective  Observation/Palpation  Observation: Flexed posture; amb without AD    Spine  Lumbar: standing: flex fingers to mid shin, ext limited to neutral     Strength RLE  Comment: hip 4-/5, knee 4 to 4+/5, ankle 4- to 4/5  Strength LLE  Comment: hip 4-/5, knee 4 to 4+/5, ankle 4- to 4/5  Strength Other  Other: Core strength: unable to maintain supine double leg lift    Additional Measures  Special Tests: slump testing negative      Exercises:  Exercise 1: **HEP - sitting trunk flexion, LTR        Functional Outcome Measures    Timed Up and Go: 22.47     Get out of bed: Very difficult  Sleep through the night: Fairly difficult  Turn over in bed: Fairly difficult  Ride in a car: Fairly difficult  Stand up for 20-30 minutes: Very difficult  Sit in a chair for several hours: Minimally difficult  Climb one flight of stairs: Very difficult  Walk a few blocks (300-400 m): Unable to do  Walk several kilometers - miles: Unable to do  Reach up to high shelves: Fairly difficult  Throw a ball: Unable to do  Run one block (about 100 m): Unable to do  Take food out of the refrigerator: Somewhat difficult  Make your bed: Fairly difficult  Put on socks (pantyhose): Fairly difficult  Bend over to clean the bathtub: Very difficult  Move a chair: Very difficult  Pull or push heavy doors: Very difficult  Carry two bags of groceries: Very difficult  Lift and carry a heavy suitcase: Unable to do  Tony Total Score: 74           Assessment  Assessment: Pt is 78 y/o female with complaints of chronic low back pain. Pt presents with flexed posture; ambulates without use of AD. Lumbar ROM in standing: flex fingers to mid shin, ext limited to neutral. Core strength: unable to maintain supine double leg lift. Slump testing is negative bilateral. [place] Back Pain Disability Scale Score: 74. Strength bilateral hips 4-/5, knees 4 to 4+/5, ankles 4- to 4/5. TUG without AD: 22.47 seconds. Pt will benefit from PT. Prognosis: Good        Decision Making: Medium Complexity    Patient Education  Patient Education: PT eval, POC, and HEP  Pt verbalized/demonstrated good understanding:     [X] Yes         [] No, pt required further clarification. Goals  Short term goals  Time Frame for Short term goals: 3 weeks  Short term goal 1: Pt to be instructed in home program.  Short term goal 2: Pt to begin core strengthening ex for improved lumbar stability. Long term goals  Time Frame for Long term goals : 6 weeks  Long term goal 1: Pt to report independence and compliance with home program.  Long term goal 2: Pt to complete TUG without AD in less than 17.0 seconds indicating improved gait and stability.   Long term goal 3: Pt to achieve 4/5 strength bilateral hips to assist

## 2021-12-29 NOTE — PLAN OF CARE
[facility]           Phone: [phone]             Outpatient Physical Therapy  Fax: [phone]                                           Date: 2021  Patient: Tamir Wu : 1945 CSN #: [id number]   Referring Practitioner:  Sukhdev Schultz DO Referral Date:  12/15/21       [x] Plan of Care   [] Updated Plan of Care    Dates of Service to Include: 2021 to 22    Diagnosis:  Lumbar scoliosis, M41.9; Other intervertebral disc degeneration, lumbar region, M51.36    Rehab (Treatment) Diagnosis:  Low back pain             Onset Date:  12/15/21    Attendance  Total # of Visits to Date: 1 No Show: 0 Canceled Appointment: 0    Assessment  Assessment: Pt is 78 y/o female with complaints of chronic low back pain. Pt presents with flexed posture; ambulates without use of AD. Lumbar ROM in standing: flex fingers to mid shin, ext limited to neutral. Core strength: unable to maintain supine double leg lift. Slump testing is negative bilateral. [place] Back Pain Disability Scale Score: 74. Strength bilateral hips 4-/5, knees 4 to 4+/5, ankles 4- to 4/5. TUG without AD: 22.47 seconds. Pt will benefit from PT. Goals  Short term goals  Time Frame for Short term goals: 3 weeks  Short term goal 1: Pt to be instructed in home program.  Short term goal 2: Pt to begin core strengthening ex for improved lumbar stability. Long term goals  Time Frame for Long term goals : 6 weeks  Long term goal 1: Pt to report independence and compliance with home program.  Long term goal 2: Pt to complete TUG without AD in less than 17.0 seconds indicating improved gait and stability. Long term goal 3: Pt to achieve 4/5 strength bilateral hips to assist with functional tasks. Long term goal 4: Pt to score no greater than 63 on [place] Back Pain Disability Scale indicating improved quality of life. Prognosis  Prognosis: Good    Treatment Plan   Times per week: 2  Plan weeks: 6  [x] HP/CP      [x] Electrical Stim   [x] Therapeutic Exercise      [] Gait Training  [x] Aquatics   [] Ultrasound         [x] Patient Education/HEP   [x] Manual Therapy  [] Traction    [] Neuro-laci        [x] Soft Tissue Mobs            [] Home TENS  [] Iontophoresis    [] Orthotic casting/fitting      [] Dry Needling             Electronically signed by: Laurann Phoenix, PT, DPT, CMPT    Date: 12/27/2021      ______________________________________ Date: 12/27/2021   Physician Signature

## 2022-01-03 ENCOUNTER — HOSPITAL ENCOUNTER (OUTPATIENT)
Dept: PHYSICAL THERAPY | Age: 77
Setting detail: THERAPIES SERIES
Discharge: HOME | End: 2022-01-03
Payer: MEDICARE

## 2022-01-03 PROCEDURE — 97113 AQUATIC THERAPY/EXERCISES: CPT

## 2022-01-03 NOTE — PROGRESS NOTES
Phone: [facility]           Fax: [phone]                           Outpatient Physical Therapy                                                                            Daily Note    Patient: Carly Ibrahim : 1945  CSN #: [id number]   Referring Practitioner:  Carmen Lee DO    Referral Date : 12/15/21     Date: 1/3/2022    Diagnosis: Lumbar scoliosis, M41.9; Other intervertebral disc degeneration, lumbar region, M51.36  Treatment Diagnosis: Low back pain    Onset Date: 12/15/21  PT Insurance Information: Medicare  Total # of Visits Approved: 18 Per Physician Order  Total # of Visits to Date: 3  No Show: 0  Canceled Appointment: 0      Pre-Treatment Pain:  6/10  Subjective: Pt reports back pain 6/10    Exercises:  Exercise 1: **HEP - sitting trunk flexion, LTR  Exercise 2: Pool: water walking x3 laps fwd/lat/retro  Exercise 3: Pool:  step stretches 10\" x5  Exercise 4: Pool:  sink ex in 3' x15  Exercise 5: Pool: SKTC 5\"x5  Exercise 6: Pool:  Blue float push down x10 ea side  Exercise 7: Pool:  Blue bar for abs x15  Exercise 10: AQ:  deep water bike and hang x5 min        Assessment  Assessment: Progressed pt with aquatic exercises within tolerance. Pt with no c/o of increased pain. Pt reports compliance with HEP. WIll cont with aquatics to increased strength/ROM in an unloaded environment. Activity Tolerance  Activity Tolerance: Patient Tolerated treatment well    Patient Education  Patient Education: Pt educated on progression of aquatic exercises with good understanding  Pt verbalized/demonstrated good understanding:     [x] Yes         [] No, pt required further clarification.        Post Treatment Pain:  6/10      Plan  Times per week: 2  Plan weeks: 6      Goals  (Total # of Visits to Date: 3)      Short term goals  Time Frame for Short term goals: 3 weeks  Short term goal 1: Pt to be instructed in home program. -met  Short term goal 2: Pt to begin core strengthening ex for improved lumbar stability. -met    Long term goals  Time Frame for Long term goals : 6 weeks  Long term goal 1: Pt to report independence and compliance with home program.  Long term goal 2: Pt to complete TUG without AD in less than 17.0 seconds indicating improved gait and stability. Long term goal 3: Pt to achieve 4/5 strength bilateral hips to assist with functional tasks. Long term goal 4: Pt to score no greater than 63 on [place] Back Pain Disability Scale indicating improved quality of life.     Minutes Tracking:  Time In: 4220  Time Out: 1353  Minutes: [address], [place]     Date: 1/3/2022

## 2022-01-05 ENCOUNTER — HOSPITAL ENCOUNTER (OUTPATIENT)
Dept: PHYSICAL THERAPY | Age: 77
Setting detail: THERAPIES SERIES
End: 2022-01-05
Payer: MEDICARE

## 2022-01-12 ENCOUNTER — APPOINTMENT (OUTPATIENT)
Dept: PHYSICAL THERAPY | Age: 77
End: 2022-01-12
Payer: MEDICARE

## 2022-01-14 ENCOUNTER — HOSPITAL ENCOUNTER (OUTPATIENT)
Dept: PHYSICAL THERAPY | Age: 77
Setting detail: THERAPIES SERIES
Discharge: HOME | End: 2022-01-14
Payer: MEDICARE

## 2022-01-14 PROCEDURE — 97113 AQUATIC THERAPY/EXERCISES: CPT

## 2022-01-14 NOTE — PROGRESS NOTES
Phone: [facility]           Fax: [phone]                           Outpatient Physical Therapy                                                                            Daily Note    Patient: Patrica Helm : 1945  CSN #: [id number]   Referring Practitioner:  Elizabeth Barahona DO    Referral Date : 12/15/21     Date: 2022    Diagnosis: Lumbar scoliosis, M41.9; Other intervertebral disc degeneration, lumbar region, M51.36  Treatment Diagnosis: Low back pain    Onset Date: 21  PT Insurance Information: Medicare  Total # of Visits Approved: 18 Per Physician Order  Total # of Visits to Date: 4  No Show: 1  Canceled Appointment: 1      Pre-Treatment Pain:  3/10  Subjective: Pt rates her low back a 3/10. States she had injections in low back on Tuesday and is \"getting more on Monday. \"    Exercises:  Exercise 1: **HEP - sitting trunk flexion, LTR  Exercise 2: Pool: water walking x3 laps fwd/lat/retro  Exercise 3: Pool:  step stretches 10\" x5  Exercise 4: Pool:  sink ex in 3' x15  Exercise 5: Pool: SKTC 5\"x5  Exercise 7: Pool:  Blue bar for abs x15  Exercise 8: Pool: sit to stand x10  Exercise 10: AQ:  deep water bike and hang x5 min  Exercise 11: AQ: jet massage with seated LAQ and march x1 min ea    Assessment  Assessment: Progressed functional strengthening today to help progress towards goals. Pt reports her pain will increase with household tasks, but feels injections have helped. Doing HEP as instructed. WIll continue to progress aquatics for increased strength/ROM in an unloaded environment. Activity Tolerance  Activity Tolerance: Patient Tolerated treatment well    Patient Education  Continue HEP, exercise progression  Pt verbalized/demonstrated good understanding:     [x] Yes         [] No, pt required further clarification.        Post Treatment Pain: 3 /10      Plan  Times per week: 2  Plan weeks: 6      Goals  (Total # of Visits to Date: 4)      Short term goals  Time Frame for Short term goals: 3 weeks  Short term goal 1: Pt to be instructed in home program. -met  Short term goal 2: Pt to begin core strengthening ex for improved lumbar stability. -met    Long term goals  Time Frame for Long term goals : 6 weeks  Long term goal 1: Pt to report independence and compliance with home program.  Long term goal 2: Pt to complete TUG without AD in less than 17.0 seconds indicating improved gait and stability. Long term goal 3: Pt to achieve 4/5 strength bilateral hips to assist with functional tasks. Long term goal 4: Pt to score no greater than 63 on [place] Back Pain Disability Scale indicating improved quality of life.     Minutes Tracking:  Time In: 0902  Time Out: 0945  Minutes: [address], PTA     Date: 1/14/2022

## 2022-01-17 ENCOUNTER — HOSPITAL ENCOUNTER (OUTPATIENT)
Dept: PHYSICAL THERAPY | Age: 77
Setting detail: THERAPIES SERIES
Discharge: HOME | End: 2022-01-17
Payer: MEDICARE

## 2022-01-17 PROCEDURE — 97113 AQUATIC THERAPY/EXERCISES: CPT

## 2022-01-17 NOTE — PROGRESS NOTES
Phone: Darius           Fax: [phone]                           Outpatient Physical Therapy                                                                            Daily Note    Patient: Carly Ibrahim : 1945  CSN #: [id number]   Referring Practitioner:  Carmen Lee DO    Referral Date : 12/15/21     Date: 2022    Diagnosis: Lumbar scoliosis, M41.9; Other intervertebral disc degeneration, lumbar region, M51.36  Treatment Diagnosis: Low back pain    Onset Date: 21  PT Insurance Information: Medicare  Total # of Visits Approved: 18 Per Physician Order  Total # of Visits to Date: 5  No Show: 1  Canceled Appointment: 1      Pre-Treatment Pain:  8/10  Subjective: Pt states she has her appt later this afternoon for injections in low back. Pt states her back pain is a 8/10. Pt reports she had to shovel snow this morning and had increased back pain since. Exercises:  Exercise 1: **HEP - sitting trunk flexion, LTR  Exercise 2: Pool: water walking x3 laps fwd/lat/retro  Exercise 3: Pool:  step stretches 10\" x5  Exercise 4: Pool:  sink ex in 3' x15  Exercise 5: Pool: SKTC 5\"x5  Exercise 7: Pool:  Blue bar for abs x15  Exercise 8: Pool: sit to stand x10  Exercise 10: AQ:  deep water bike and hang x5 min  Exercise 11: AQ: jet massage with seated LAQ and march x1 min ea    Assessment  Assessment: Continued with current program in pool today with fair tolerance. Pt reports pain is increased with ADLs and hopes injections she will get later today will help. She reports pain is the same after tx today. WIll progress aquatics for increased strength/ROM in an unloaded environment. Activity Tolerance  Activity Tolerance: Patient Tolerated treatment well    Patient Education  Continue HEP, exercise technique, avoid painful ROM  Pt verbalized/demonstrated good understanding:     [x] Yes         [] No, pt required further clarification. Post Treatment Pain:  8/10      Plan  Times per week: 2  Plan weeks: 6      Goals  (Total # of Visits to Date: 5)      Short term goals  Time Frame for Short term goals: 3 weeks  Short term goal 1: Pt to be instructed in home program. -met  Short term goal 2: Pt to begin core strengthening ex for improved lumbar stability. -met    Long term goals  Time Frame for Long term goals : 6 weeks  Long term goal 1: Pt to report independence and compliance with home program.  Long term goal 2: Pt to complete TUG without AD in less than 17.0 seconds indicating improved gait and stability. Long term goal 3: Pt to achieve 4/5 strength bilateral hips to assist with functional tasks. Long term goal 4: Pt to score no greater than 63 on [place] Back Pain Disability Scale indicating improved quality of life.     Minutes Tracking:  Time In: 8716  Time Out: [address],[address]  Minutes: 17 [facility], [facility]     Date: 1/17/2022

## 2022-01-19 ENCOUNTER — HOSPITAL ENCOUNTER (OUTPATIENT)
Dept: PHYSICAL THERAPY | Age: 77
Setting detail: THERAPIES SERIES
Discharge: HOME | End: 2022-01-19
Payer: MEDICARE

## 2022-01-19 PROCEDURE — 97113 AQUATIC THERAPY/EXERCISES: CPT

## 2022-01-19 ASSESSMENT — PAIN SCALES - QUEBEC BACK PAIN DISABILITY SCALE
PUT ON SOCKS OR PANYHOSE: 0
WALK SEVERAL KILOMETERS  OR MILES: 5
REACH UP TO HIGH SHELVES: 4
RIDE IN A CAR: 0
GET OUT OF BED: 2
TAKE FOOD OUT OF THE REFRIGERATOR: 0
CARRY TWO BAGS OF GROCERIES: 4
STAND UP FOR 20 TO 30 MINUTES: 4
SLEEP THROUGH THE NIGHT: 0
BEND OVER TO CLEAN THE BATHTUB: 4
PULL OR PUSH HEAVY DOORS: 4
MOVE A CHAIR: 0
LIFT AND CARRY A HEAVY SUITCASE: 3
MAKE YOUR BED: 0
TOTAL SCORE: 53
CLIMB ONE FLIGHT OF STAIRS: 4
QUEBEC CMS MODIFIER: CK
RUN ONE BLOCK OR 100M: 5
QUEBEC DISABILITY INDEX: 40-59%
THROW A BALL: 5
SIT IN A CHAIR FOR SEVERAL HOURS: 3
TURN OVER IN BED: 1
WALK A FEW BLOCKS OR 300 TO 400M: 5

## 2022-01-19 NOTE — PROGRESS NOTES
Phone: Darius           Fax: [phone]                           Outpatient Physical Therapy                                                                            Daily Note    Patient: Geoff Baltazar : 1945  CSN #: [id number]   Referring Practitioner:  Herbie Rutherford DO    Referral Date : 12/15/21     Date: 2022    Diagnosis: Lumbar scoliosis, M41.9; Other intervertebral disc degeneration, lumbar region, M51.36  Treatment Diagnosis: Low back pain    Onset Date: 21  PT Insurance Information: Medicare  Total # of Visits Approved: 18 Per Physician Order  Total # of Visits to Date: 6  No Show: 1  Canceled Appointment: 1      Pre-Treatment Pain:  3/10  Subjective: Pt reports noting some relief with injections she had on Monday. Pt states she returns back in two weeks for f/u. Pt rates current back pain a 3/10. Exercises:  Exercise 1: **HEP - sitting trunk flexion, LTR  Exercise 2: Pool: water walking x3 laps fwd/lat/retro; march walk x3 laps  Exercise 3: Pool:  step stretches 10\" x5; FSU/LSU/SD x15 ea  Exercise 5: Pool: SKTC 5\"x5  Exercise 6: Pool:  Blue float push down x15 ea side  Exercise 7: Pool:  Blue bar for abs x15  Exercise 9: AQ:  wall slide 5\" hold x10    Assessment  Assessment: Pt \"thinks the injections helped a bit\" but reports doing chores at home later that day, so pain increased. Progressed LE strengthening today with no increased pain. Pt scored a 53 on [place] back pain disability scale. Will continue with aquatics to increase strength/ROM in an unloaded environment. Activity Tolerance  Activity Tolerance: Patient Tolerated treatment well    Patient Education  Ex technique, goals  Pt verbalized/demonstrated good understanding:     [x] Yes         [] No, pt required further clarification.        Post Treatment Pain:  \"better than when I came in.\"      Plan  Times per week: 2  Plan weeks: 6      Goals  (Total # of Visits to Date: 6)      Short term goals  Time Frame for Short term goals: 3 weeks  Short term goal 1: Pt to be instructed in home program. -met  Short term goal 2: Pt to begin core strengthening ex for improved lumbar stability. -met    Long term goals  Time Frame for Long term goals : 6 weeks  Long term goal 1: Pt to report independence and compliance with home program.- met  Long term goal 2: Pt to complete TUG without AD in less than 17.0 seconds indicating improved gait and stability. Long term goal 3: Pt to achieve 4/5 strength bilateral hips to assist with functional tasks.   Long term goal 4: Pt to score no greater than 63 on [place] Back Pain Disability Scale indicating improved quality of life. -53 as of 1/19/22    Minutes Tracking:  Time In: 3480  Time Out: [address],[address]  Minutes: JESUS Alejo     Date: 1/19/2022

## 2022-01-24 ENCOUNTER — HOSPITAL ENCOUNTER (OUTPATIENT)
Dept: PHYSICAL THERAPY | Age: 77
Setting detail: THERAPIES SERIES
Discharge: HOME | End: 2022-01-24
Payer: MEDICARE

## 2022-01-24 PROCEDURE — 97113 AQUATIC THERAPY/EXERCISES: CPT

## 2022-01-24 NOTE — PROGRESS NOTES
Phone: Darius           Fax: [phone]                           Outpatient Physical Therapy                                                                            Daily Note    Patient: Echo Looney : 1945  CSN #: [id number]   Referring Practitioner:  Araceli Carpenter DO    Referral Date : 12/15/21     Date: 2022    Diagnosis: Lumbar scoliosis, M41.9; Other intervertebral disc degeneration, lumbar region, M51.36  Treatment Diagnosis: Low back pain    Onset Date: 21  PT Insurance Information: Medicare  Total # of Visits Approved: 18 Per Physician Order  Total # of Visits to Date: 7  No Show: 1  Canceled Appointment: 1      Pre-Treatment Pain: 4 /10  Subjective: Pt reports 4/10 low back pain today. States she has been shoveling snow this weekend and \" had to take a lot of breaks because of pain. \"    Exercises:  Exercise 1: **HEP - sitting trunk flexion, LTR  Exercise 2: Pool: water walking x3 laps fwd/lat/retro; march walk x3 laps  Exercise 3: Pool:  step stretches 10\" x5; FSU/LSU/SD x15 ea  Exercise 4: Pool:  sink ex in 3' x15  Exercise 5: Pool: SKTC 5\"x5  Exercise 7: Pool:  Blue bar for abs x15  Exercise 8: Pool: sit to stand x10  Exercise 9: AQ:  wall slide 5\" hold x10  Exercise 10: AQ:  deep water bike and hang x5 min    Assessment  Assessment: Pt feels the \"injection wore off. \"  States her pain has been higher the last few days from shoveling snow. Pt requires minimal cues for correct exercise technique. WIll continue with aquatics to increase strength/ROM in an unloaded environment. Activity Tolerance  Activity Tolerance: Patient Tolerated treatment well    Patient Education  Ex technique  Pt verbalized/demonstrated good understanding:     [x] Yes         [] No, pt required further clarification.        Post Treatment Pain:  3/10      Plan  Times per week: 2  Plan weeks: 6      Goals  (Total # of Visits to Date: 7)      Short term goals  Time Frame for Short term goals: 3 weeks  Short term goal 1: Pt to be instructed in home program. -met  Short term goal 2: Pt to begin core strengthening ex for improved lumbar stability. -met    Long term goals  Time Frame for Long term goals : 6 weeks  Long term goal 1: Pt to report independence and compliance with home program.- met  Long term goal 2: Pt to complete TUG without AD in less than 17.0 seconds indicating improved gait and stability. Long term goal 3: Pt to achieve 4/5 strength bilateral hips to assist with functional tasks.   Long term goal 4: Pt to score no greater than 63 on [place] Back Pain Disability Scale indicating improved quality of life. -53 as of 1/19/22    Minutes Tracking:  Time In: 5952  Time Out: [address]  Minutes: [address] [facility]     Date: 1/24/2022

## 2022-01-31 ENCOUNTER — HOSPITAL ENCOUNTER (OUTPATIENT)
Dept: PHYSICAL THERAPY | Age: 77
Setting detail: THERAPIES SERIES
Discharge: HOME | End: 2022-01-31
Payer: MEDICARE

## 2022-01-31 PROCEDURE — 97113 AQUATIC THERAPY/EXERCISES: CPT

## 2022-01-31 NOTE — PROGRESS NOTES
Phone: Darius           Fax: [phone]                           Outpatient Physical Therapy                                                                            Daily Note    Patient: Suzette Gustafson : 1945  CSN #: [id number]   Referring Practitioner:  José Miguel Moya DO    Referral Date : 12/15/21     Date: 2022    Diagnosis: Lumbar scoliosis, M41.9; Other intervertebral disc degeneration, lumbar region, M51.36  Treatment Diagnosis: Low back pain    Onset Date: 21  PT Insurance Information: Medicare  Total # of Visits Approved: 18 Per Physician Order  Total # of Visits to Date: 8  No Show: 1  Canceled Appointment: 2      Pre-Treatment Pain:  /10  Subjective: Pt reports 4/10 low back pain. Pt reports follow up with her  this afternoon in regards to injections she received 2 weeks ago to see if they helped. Exercises:  Exercise 1: **HEP - sitting trunk flexion, LTR  Exercise 2: Pool: water walking x3 laps fwd/lat/retro; march walk x3 laps  Exercise 3: Pool:  step stretches 10\" x5; FSU/LSU/SD x15 ea  Exercise 4: Pool:  sink ex in 3' x15  Exercise 7: Pool:  Blue bar for abs x15;  squat holding blue bar down at hips x10  Exercise 8: Pool: sit to stand x15  Exercise 9: AQ:  wall slide 5\" hold x10  Exercise 10: AQ:  deep water bike and hang x5 min    Assessment  Assessment: Progressed functional core strengthening today which included dynamic movements of trunk. No increased pain reported. Added 5# ankle weights to deep water hang to increase decompression pull. Will continue with aquatic for increased strength/ROM in an unloaded environment. Activity Tolerance  Activity Tolerance: Patient Tolerated treatment well    Patient Education  Ex technique  Pt verbalized/demonstrated good understanding:     [x] Yes         [] No, pt required further clarification.        Post Treatment Pain: 4 /10      Plan  Times per week: 2  Plan weeks: 6      Goals  (Total # of Visits to Date: 8)      Short term goals  Time Frame for Short term goals: 3 weeks  Short term goal 1: Pt to be instructed in home program. -met  Short term goal 2: Pt to begin core strengthening ex for improved lumbar stability. -met    Long term goals  Time Frame for Long term goals : 6 weeks  Long term goal 1: Pt to report independence and compliance with home program.- met  Long term goal 2: Pt to complete TUG without AD in less than 17.0 seconds indicating improved gait and stability. Long term goal 3: Pt to achieve 4/5 strength bilateral hips to assist with functional tasks.   Long term goal 4: Pt to score no greater than 63 on [place] Back Pain Disability Scale indicating improved quality of life. -53 as of 1/19/22    Minutes Tracking:  Time In: 9363  Time Out: [address]  Minutes: [address], PTA     Date: 1/31/2022

## 2022-02-02 ENCOUNTER — APPOINTMENT (OUTPATIENT)
Dept: PHYSICAL THERAPY | Age: 77
End: 2022-02-02
Payer: MEDICARE

## 2022-02-09 ENCOUNTER — HOSPITAL ENCOUNTER (OUTPATIENT)
Dept: PHYSICAL THERAPY | Age: 77
Setting detail: THERAPIES SERIES
Discharge: HOME | End: 2022-02-09
Payer: MEDICARE

## 2022-02-09 PROCEDURE — 97140 MANUAL THERAPY 1/> REGIONS: CPT

## 2022-02-09 PROCEDURE — 97110 THERAPEUTIC EXERCISES: CPT

## 2022-02-09 ASSESSMENT — PAIN SCALES - QUEBEC BACK PAIN DISABILITY SCALE
STAND UP FOR 20 TO 30 MINUTES: 4
WALK A FEW BLOCKS OR 300 TO 400M: 5
TURN OVER IN BED: 2
RUN ONE BLOCK OR 100M: 5
SIT IN A CHAIR FOR SEVERAL HOURS: 4
LIFT AND CARRY A HEAVY SUITCASE: 4
CLIMB ONE FLIGHT OF STAIRS: 4
TAKE FOOD OUT OF THE REFRIGERATOR: 1
SLEEP THROUGH THE NIGHT: 3
PULL OR PUSH HEAVY DOORS: 5
REACH UP TO HIGH SHELVES: 3
QUEBEC CMS MODIFIER: CL
MAKE YOUR BED: 1
PUT ON SOCKS OR PANYHOSE: 2
WALK SEVERAL KILOMETERS  OR MILES: 5
TOTAL SCORE: 67
GET OUT OF BED: 0
RIDE IN A CAR: 2
QUEBEC DISABILITY INDEX: 60-79%
MOVE A CHAIR: 4
BEND OVER TO CLEAN THE BATHTUB: 4
CARRY TWO BAGS OF GROCERIES: 4
THROW A BALL: 5

## 2022-02-10 NOTE — PROGRESS NOTES
Phone: Darius           Fax: [phone]                           Outpatient Physical Therapy                                                                            Daily Note    Patient: Mesfin Starr : 1945  CSN #: [id number]   Referring Practitioner:  Flynn Burkitt, DO    Referral Date : 12/15/21     Date: 2022    Diagnosis: Lumbar scoliosis, M41.9; Other intervertebral disc degeneration, lumbar region, M51.36  Treatment Diagnosis: Low back pain    Onset Date: 21  PT Insurance Information: Medicare  Total # of Visits Approved: 18 Per Physician Order  Total # of Visits to Date: 9  No Show: 1  Canceled Appointment: 2      Pre-Treatment Pain:  4/10  Subjective: Pt states she believe pool is helping a bit with low back; however, still cannot make it down the driveway and back without sitting. Manual:  Soft Tissue Mobalization: STM with heated thermoprobe x 8 mins in sitting      Assessment  Assessment: Pt has completed 9 PT sessions for low back pain. Treatment has consisted of aquatic based therapy to increase core strength. TUG without AD: 14.71 seconds. [place] Pain Scale this date 79. Strength bilateral hips 4- to 4/5 with MMT. Will continue with aquatic based program to increase core strength due to continued limited standing tolerance. Activity Tolerance  Activity Tolerance: Patient Tolerated treatment well    Patient Education  Patient Education: Jolene Lazaro  Pt verbalized/demonstrated good understanding:     [x] Yes         [] No, pt required further clarification. Post Treatment Pain:  2/10      Plan  Times per week: 2  Plan weeks: 6      Goals  (Total # of Visits to Date: 5)      Short term goals  Time Frame for Short term goals: 3 weeks  Short term goal 1: Pt to be instructed in home program. -met  Short term goal 2: Pt to begin core strengthening ex for improved lumbar stability.  -met    Long term goals  Time Frame for Long term goals : 6 weeks  Long term goal 1: Pt to report independence and compliance with home program.- met  Long term goal 2: Pt to complete TUG without AD in less than 17.0 seconds indicating improved gait and stability. - met  Long term goal 3: Pt to achieve 4/5 strength bilateral hips to assist with functional tasks. - progressing  Long term goal 4: Pt to score no greater than 63 on [place] Back Pain Disability Scale indicating improved quality of life.  - on going    Minutes Tracking:  Time In: 5663  Time Out: [address]  Minutes: 46  Timed Code Treatment Minutes: [address], PT , DPT, CMPT      Date: 2/9/2022

## 2022-02-10 NOTE — PLAN OF CARE
[facility]           Phone: [phone]             Outpatient Physical Therapy  Fax: [phone]                                           Date: 2022  Patient: Damaris Rust : 1945 CSN #: [id number]   Referring Practitioner:  Saurav Steele DO Referral Date:  12/15/21       [] Plan of Care   [x] Updated Plan of Care    Dates of Service to Include: 2022 to 22    Diagnosis:  Lumbar scoliosis, M41.9; Other intervertebral disc degeneration, lumbar region, M51.36    Rehab (Treatment) Diagnosis:  Low back pain             Onset Date:  21    Attendance  Total # of Visits to Date: 9 No Show: 1 Canceled Appointment: 2    Assessment  Assessment: Pt has completed 9 PT sessions for low back pain. Treatment has consisted of aquatic based therapy to increase core strength. TUG without AD: 14.71 seconds. [place] Pain Scale this date 79. Strength bilateral hips 4- to 4/5 with MMT. Will continue with aquatic based program to increase core strength due to continued limited standing tolerance. Goals  Short term goals  Time Frame for Short term goals: 3 weeks  Short term goal 1: Pt to be instructed in home program. -met  Short term goal 2: Pt to begin core strengthening ex for improved lumbar stability. -met  Long term goals  Time Frame for Long term goals : 6 weeks  Long term goal 1: Pt to report independence and compliance with home program.- met  Long term goal 2: Pt to complete TUG without AD in less than 17.0 seconds indicating improved gait and stability. - met  Long term goal 3: Pt to achieve 4/5 strength bilateral hips to assist with functional tasks. - progressing  Long term goal 4: Pt to score no greater than 63 on [place] Back Pain Disability Scale indicating improved quality of life.  - on going     Prognosis  Prognosis: Good    Treatment Plan   Times per week: 2  Plan weeks: 6  [x] HP/CP      [] Electrical Stim   [x] Therapeutic Exercise      [] Gait Training  [x] Aquatics   [] Ultrasound         [x] Patient Education/HEP   [x] Manual Therapy  [] Traction    [] Neuro-laci        [x] Soft Tissue Mobs            [] Home TENS  [] Iontophoresis    [] Orthotic casting/fitting      [] Dry Needling             Electronically signed by: Rachel Baltazar PT, DPT, CMPT    Date: 2/9/2022      ______________________________________ Date: 2/9/2022   Physician Signature

## 2022-02-14 ENCOUNTER — HOSPITAL ENCOUNTER (OUTPATIENT)
Dept: PHYSICAL THERAPY | Age: 77
Setting detail: THERAPIES SERIES
Discharge: HOME | End: 2022-02-14
Payer: MEDICARE

## 2022-02-14 PROCEDURE — 97113 AQUATIC THERAPY/EXERCISES: CPT

## 2022-02-14 NOTE — PROGRESS NOTES
Phone: Darius           Fax: [phone]                           Outpatient Physical Therapy                                                                            Daily Note    Patient: Ry Moran : 1945  CSN #: [id number]   Referring Practitioner:  Elizabeth Lucero DO    Referral Date : 12/15/21     Date: 2022    Diagnosis: Lumbar scoliosis, M41.9; Other intervertebral disc degeneration, lumbar region, M51.36  Treatment Diagnosis: Low back pain    Onset Date: 21  PT Insurance Information: Medicare  Total # of Visits Approved: 18 Per Physician Order  Total # of Visits to Date: 10  No Show: 1  Canceled Appointment: 2      Pre-Treatment Pain:  /10  Subjective: Pt reports her low back is a /10. Exercises:  Exercise 1: **HEP - sitting trunk flexion, LTR  Exercise 2: Pool: water walking x3 laps fwd/lat/retro; march walk x3 laps  Exercise 3: Pool:  step stretches 10\" x5; FSU/LSU/SD x15 ea  Exercise 4: Pool:  sink ex in 3' x15  Exercise 9: AQ:  wall slide 5\" hold x10  Exercise 11: AQ: jet massage with seated LAQ and march x1 min ea  Exercise 12: AQ;  fwd kick/side kick and marching with contralateral arm swing with paddles. x10  Exercise 13: AQ; side step with BUE abd x1 lap with paddles    Assessment  Assessment: Progressed dynamic core exercises today in aquatic setting without increased pain. Occasional cues for exercise technique. WIll cont with aquatics for increased strength/ROM in an unloaded environment. Activity Tolerance  Activity Tolerance: Patient Tolerated treatment well    Patient Education  Ex progression and technique   Pt verbalized/demonstrated good understanding:     [x] Yes         [] No, pt required further clarification.        Post Treatment Pain:  310      Plan  Times per week: 2  Plan weeks: 6      Goals  (Total # of Visits to Date: 10)      Short term goals  Time Frame for Short term goals: 3 weeks  Short term goal 1: Pt to be instructed in home program. -met  Short term goal 2: Pt to begin core strengthening ex for improved lumbar stability. -met    Long term goals  Time Frame for Long term goals : 6 weeks  Long term goal 1: Pt to report independence and compliance with home program.- met  Long term goal 2: Pt to complete TUG without AD in less than 17.0 seconds indicating improved gait and stability. - met  Long term goal 3: Pt to achieve 4/5 strength bilateral hips to assist with functional tasks. - progressing  Long term goal 4: Pt to score no greater than 63 on [place] Back Pain Disability Scale indicating improved quality of life.  - on going    Minutes Tracking:  Time In: [address]  Time Out: Yu  Minutes: [address], PTA     Date: 2/14/2022

## 2022-02-18 ENCOUNTER — HOSPITAL ENCOUNTER (OUTPATIENT)
Dept: PHYSICAL THERAPY | Age: 77
Setting detail: THERAPIES SERIES
Discharge: HOME | End: 2022-02-18
Payer: MEDICARE

## 2022-02-21 ENCOUNTER — HOSPITAL ENCOUNTER (OUTPATIENT)
Dept: PHYSICAL THERAPY | Age: 77
Setting detail: THERAPIES SERIES
Discharge: HOME | End: 2022-02-21
Payer: MEDICARE

## 2022-02-21 PROCEDURE — 97113 AQUATIC THERAPY/EXERCISES: CPT

## 2022-02-21 NOTE — PROGRESS NOTES
Phone: Darius           Fax: [phone]                           Outpatient Physical Therapy                                                                            Daily Note    Patient: Lizet Saez : 1945  CSN #: [id number]   Referring Practitioner:  Oswald Carlton DO    Referral Date : 12/15/21     Date: 2022    Diagnosis: Lumbar scoliosis, M41.9; Other intervertebral disc degeneration, lumbar region, M51.36  Treatment Diagnosis: Low back pain    Onset Date: 21  PT Insurance Information: Medicare  Total # of Visits Approved: 18 Per Physician Order  Total # of Visits to Date: 11  No Show: 1  Canceled Appointment: 3      Pre-Treatment Pain: 3 /10  Subjective: Pt states her pain is currently 3/10. She reports her B UEs are numb and is going to chiropractor after PT appt and then has follow up with PCP this afternoon. Exercises:  Exercise 1: **HEP - sitting trunk flexion, LTR  Exercise 2: Pool: water walking x3 laps fwd/lat/retro; march walk x3 laps  Exercise 3: Pool:  step stretches 10\" x5; FSU/LSU/SD x15 ea  Exercise 4: Pool:  sink ex in 3' x15  Exercise 5: Pool: SKTC 5\"x5  Exercise 7: Pool:  Blue bar for abs x15;  squat holding blue bar down at hips x10  Exercise 8: Pool: sit to stand x15  Exercise 9: AQ:  wall slide 5\" hold x10  Exercise 10: AQ:  deep water bike and hang x5 min  Exercise 11: AQ: jet massage with seated LAQ and march x1 min ea  Exercise 14: figure 4 hip/LB stretching  Exercise 15: Squat while keeping float pushed under water x10    Assessment  Assessment: Continued with current aquatic program without complaints. Pt states her back pain tends to increase with activity as the day goes on. Not sure if she gets injections later today at her follow up with PCP. Will continue with aquatics for increased strength/ROM in an unloaded environment.     Activity Tolerance  Activity Tolerance: Patient Tolerated treatment well    Patient Education  Ex technique and rationale  Pt verbalized/demonstrated good understanding:     [x] Yes         [] No, pt required further clarification. Post Treatment Pain:  3/10      Plan  Times per week: 2  Plan weeks: 6      Goals  (Total # of Visits to Date: 6)      Short term goals  Time Frame for Short term goals: 3 weeks  Short term goal 1: Pt to be instructed in home program. -met  Short term goal 2: Pt to begin core strengthening ex for improved lumbar stability. -met    Long term goals  Time Frame for Long term goals : 6 weeks  Long term goal 1: Pt to report independence and compliance with home program.- met  Long term goal 2: Pt to complete TUG without AD in less than 17.0 seconds indicating improved gait and stability. - met  Long term goal 3: Pt to achieve 4/5 strength bilateral hips to assist with functional tasks. - progressing  Long term goal 4: Pt to score no greater than 63 on [place] Back Pain Disability Scale indicating improved quality of life.  - on going    Minutes Tracking:  Time In: 0902  Time Out: 10 [address]  Minutes: 17 [address] PTA     Date: 2/21/2022

## 2022-02-25 ENCOUNTER — HOSPITAL ENCOUNTER (OUTPATIENT)
Dept: PHYSICAL THERAPY | Age: 77
Setting detail: THERAPIES SERIES
Discharge: HOME | End: 2022-02-25
Payer: MEDICARE

## 2022-02-25 PROCEDURE — 97113 AQUATIC THERAPY/EXERCISES: CPT

## 2022-02-25 NOTE — PROGRESS NOTES
Phone: Darius           Fax: [phone]                           Outpatient Physical Therapy                                                                            Daily Note    Patient: Tyson Torres : 1945  CSN #: [id number]   Referring Practitioner:  Rome Florence DO    Referral Date : 12/15/21     Date: 2022    Diagnosis: Lumbar scoliosis, M41.9; Other intervertebral disc degeneration, lumbar region, M51.36  Treatment Diagnosis: Low back pain    Onset Date: 21  PT Insurance Information: Medicare  Total # of Visits Approved: 18 Per Physician Order  Total # of Visits to Date: 12  No Show: 1  Canceled Appointment: 3      Pre-Treatment Pain:  4/10  Subjective: pt reports getting more injections on Monday. She reports she feels like she has some relief from it but only notices when shes at rest.  States pain still is increased with activity and lifting. Pt rates 4/10 low back pain currently. Exercises:  Exercise 1: **HEP - sitting trunk flexion, LTR  Exercise 2: Pool:fwd and retro walk fast enid using push plate x4 laps  Exercise 3: Pool:  step stretches 10\" x5; FSU/LSU/SD x15 ea  Exercise 5: Pool:  water walk fwd, retro x3 laps; Exercise 6: Pool:  Blue float push down x15 ea side  Exercise 7: Pool:  squat holding blue bar down at hips x10  Exercise 8: Pool: sit to stand x15  Exercise 10: AQ:  deep water bike and hang x5 min  Exercise 11: AQ: jet massage with seated LAQ and march x1 min ea  Exercise 12: AQ;  fwd kick/side kick and marching with contralateral arm swing with paddles. x10  Exercise 13: AQ; side step with BUE abd x4 laps  Exercise 15: Squat while keeping float pushed under water x10    Assessment  Assessment: Progressed dynamic trunk strengthening today without increased pain. Added resistance to fwd walking to increase ambulation tolerance.  AROM of lumbar spine is improving, finger tips down to distal shin but no pain.  Will continue with aquatics for increased strength/ROM in an unloaded environment. Activity Tolerance  Activity Tolerance: Patient Tolerated treatment well    Patient Education  Ex technique and progression  Pt verbalized/demonstrated good understanding:     [x] Yes         [] No, pt required further clarification. Post Treatment Pain:  4/10      Plan  Times per week: 2  Plan weeks: 6      Goals  (Total # of Visits to Date: 15)      Short term goals  Time Frame for Short term goals: 3 weeks  Short term goal 1: Pt to be instructed in home program. -met  Short term goal 2: Pt to begin core strengthening ex for improved lumbar stability. -met    Long term goals  Time Frame for Long term goals : 6 weeks  Long term goal 1: Pt to report independence and compliance with home program.- met  Long term goal 2: Pt to complete TUG without AD in less than 17.0 seconds indicating improved gait and stability. - met  Long term goal 3: Pt to achieve 4/5 strength bilateral hips to assist with functional tasks. - progressing  Long term goal 4: Pt to score no greater than 63 on [place] Back Pain Disability Scale indicating improved quality of life.  - on going    Minutes Tracking:  Time In: 8628  Time Out: [address],[address]  Minutes: 69 Eze Walker, PTA     Date: 2/25/2022\

## 2022-02-28 ENCOUNTER — HOSPITAL ENCOUNTER (OUTPATIENT)
Dept: PHYSICAL THERAPY | Age: 77
Setting detail: THERAPIES SERIES
Discharge: HOME | End: 2022-02-28
Payer: MEDICARE

## 2022-02-28 PROCEDURE — 97113 AQUATIC THERAPY/EXERCISES: CPT

## 2022-02-28 NOTE — PROGRESS NOTES
Phone: Darius           Fax: [phone]                           Outpatient Physical Therapy                                                                            Daily Note    Patient: Sarah Rogers : 1945  CSN #: [id number]   Referring Practitioner:  Merlyn Lu DO    Referral Date : 12/15/21     Date: 2022    Diagnosis: Lumbar scoliosis, M41.9; Other intervertebral disc degeneration, lumbar region, M51.36  Treatment Diagnosis: Low back pain    Onset Date: 21  PT Insurance Information: Medicare  Total # of Visits Approved: 18 Per Physician Order  Total # of Visits to Date: 13  No Show: 1  Canceled Appointment: 3      Pre-Treatment Pain:  6/10  Subjective: Pt rates low back pain a 6/10 today . Exercises:  Exercise 1: **HEP - sitting trunk flexion, LTR  Exercise 2: Pool:fwd and retro walk fast enid using push plate x4 laps  Exercise 3: Pool:  step stretches 10\" x5; FSU/LSU/SD x15 ea  Exercise 4: Pool:  sink ex in 3' x15  Exercise 5: Pool:  water walk fwd, retro x3 laps; Exercise 6: Pool:  Blue float push down x15 ea side  Exercise 10: AQ:  deep water bike and hang x5 min  Exercise 11: AQ: jet massage with seated LAQ and march x1 min ea  Exercise 12: AQ;  fwd kick/side kick and marching with contralateral arm swing with paddles. x10    Assessment  Assessment: Pain decreased following aquatic exercises today. Pt reports her muscle endurance continues to be decreased with ADLs. FOcused mostly on endurance based exercises today for LE's and core. WIll continue with aquatics for increased strength/ROM in an unloaded environment. Activity Tolerance  Activity Tolerance: Patient Tolerated treatment well    Patient Education  Ex technique  Pt verbalized/demonstrated good understanding:     [x] Yes         [] No, pt required further clarification.        Post Treatment Pain:  5/10      Plan  Times per week: 2  Plan weeks: 6      Goals  (Total # of Visits to Date: 15)      Short term goals  Time Frame for Short term goals: 3 weeks  Short term goal 1: Pt to be instructed in home program. -met  Short term goal 2: Pt to begin core strengthening ex for improved lumbar stability. -met    Long term goals  Time Frame for Long term goals : 6 weeks  Long term goal 1: Pt to report independence and compliance with home program.- met  Long term goal 2: Pt to complete TUG without AD in less than 17.0 seconds indicating improved gait and stability. - met  Long term goal 3: Pt to achieve 4/5 strength bilateral hips to assist with functional tasks. - progressing  Long term goal 4: Pt to score no greater than 63 on [place] Back Pain Disability Scale indicating improved quality of life.  - on going    Minutes Tracking:  Time In: 1503  Time Out: DAI Elaine [address]  Minutes: [address]athan Urena PTA     Date: 2/28/2022

## 2022-03-02 ENCOUNTER — HOSPITAL ENCOUNTER (OUTPATIENT)
Dept: PHYSICAL THERAPY | Age: 77
Setting detail: THERAPIES SERIES
Discharge: HOME | End: 2022-03-02
Payer: MEDICARE

## 2022-03-02 PROCEDURE — 97140 MANUAL THERAPY 1/> REGIONS: CPT

## 2022-03-02 PROCEDURE — 97113 AQUATIC THERAPY/EXERCISES: CPT

## 2022-03-02 NOTE — PROGRESS NOTES
Phone: Darius           Fax: [phone]                           Outpatient Physical Therapy                                                                            Daily Note    Patient: Yaya Christensen : 1945  CSN #: [id number]   Referring Practitioner:  Christiana Fishman DO    Referral Date : 12/15/21     Date: 3/2/2022    Diagnosis: Lumbar scoliosis, M41.9; Other intervertebral disc degeneration, lumbar region, M51.36  Treatment Diagnosis: Low back pain    Onset Date: 21  PT Insurance Information: Medicare  Total # of Visits Approved: 18 Per Physician Order  Total # of Visits to Date: 14  No Show: 1  Canceled Appointment: 3      Pre-Treatment Pain:  6/10  Subjective: Pt reports having a bad muscle spasm in the shower after her last visit. States her low back is a 6/10, feels like theres \"a tender spot. \"    Exercises:  Exercise 1: **HEP - sitting trunk flexion, LTR  Exercise 5: Pool:  water walk fwd, retro x3 laps deep water today  Exercise 8: Pool: sit to stand x15  Exercise 10: AQ:  deep water bike and hang x5 min  Exercise 11: AQ: jet massage with seated LAQ and march x1 min ea  Exercise 14: figure 4 hip/LB stretching    Manual:  Other: STM to L side low back while in pool    Assessment  Assessment: Multiple trigger points noted throughout L side of low back, gentle STM to these areas to decrease tightness with some relief reported. Focused more on stretching/mobility of low back and hips to decrease pain/stress in low back. Will continue with aquatics for increased strength/ROM in an unloaded environment. Activity Tolerance  Activity Tolerance: Patient Tolerated treatment well,Patient limited by pain    Patient Education  Manual rationale and STM at home with ball  Pt verbalized/demonstrated good understanding:     [x] Yes         [] No, pt required further clarification.        Post Treatment Pain:  5/10      Plan  Times per week: 2  Plan weeks: 6      Goals  (Total # of Visits to Date: 15)      Short term goals  Time Frame for Short term goals: 3 weeks  Short term goal 1: Pt to be instructed in home program. -met  Short term goal 2: Pt to begin core strengthening ex for improved lumbar stability. -met    Long term goals  Time Frame for Long term goals : 6 weeks  Long term goal 1: Pt to report independence and compliance with home program.- met  Long term goal 2: Pt to complete TUG without AD in less than 17.0 seconds indicating improved gait and stability. - met  Long term goal 3: Pt to achieve 4/5 strength bilateral hips to assist with functional tasks. - progressing  Long term goal 4: Pt to score no greater than 63 on [place] Back Pain Disability Scale indicating improved quality of life.  - on going    Minutes Tracking:  Time In: 7462  Time Out: [address]  Minutes: 150 Zainab Gross, PTA     Date: 3/2/2022

## 2022-03-07 ENCOUNTER — HOSPITAL ENCOUNTER (OUTPATIENT)
Dept: PHYSICAL THERAPY | Age: 77
Setting detail: THERAPIES SERIES
Discharge: HOME | End: 2022-03-07
Payer: MEDICARE

## 2022-03-07 PROCEDURE — 97113 AQUATIC THERAPY/EXERCISES: CPT

## 2022-03-07 NOTE — PROGRESS NOTES
Phone: Darius           Fax: [phone]                           Outpatient Physical Therapy                                                                            Daily Note    Patient: Anton Monsalve : 1945  CSN #: [id number]   Referring Practitioner:  Conrado Butcher DO          Date: 3/7/2022    Diagnosis: Lumbar scoliosis, M41.9; Other intervertebral disc degeneration, lumbar region, M51.36  Treatment Diagnosis: Low back pain    Onset Date: 21  PT Insurance Information: Medicare  Total # of Visits Approved: 18 Per Physician Order  Total # of Visits to Date: 15  No Show: 1  Canceled Appointment: 3      Pre-Treatment Pain:  5/10  Subjective: Pt reports her back was a sore following last PT session. Reports hre low back is a 5/10. Exercises:  Exercise 1: **HEP - sitting trunk flexion, LTR  Exercise 3: Pool:  step stretches 10\" x5; FSU/LSU/SD (water at knee level) x15 ea  Exercise 4: Pool:  sink ex in 3' x15  Exercise 5: Pool:  water walk fwd, retro x3 laps deep water today  Exercise 7: Pool:  squat holding blue bar down at hips x10  Exercise 10: AQ:  deep water bike and hang x5 min  Exercise 11: AQ: jet massage with seated LAQ and march x1 min ea  Exercise 13: AQ; side step with BUE abd x4 laps  Exercise 15: Squat while keeping float pushed under water x10    Assessment  Assessment: Pt reports trigger points in her low back feel \"somewhat better. \"  Discussed open swim option d/t pt almost finishing her pool visits. Pt states she will not be doing open swim since \"I dont come out this way too much.'  Pt states she would continue with HEP when discharged. Tolerated 5 reps on each LE with step ups with water at knee level before increased knee pain. WIll continue with aquatics for increased strength/ROM in an unloaded environment.     Activity Tolerance  Activity Tolerance: Patient Tolerated treatment well    Patient Education  HEP, open swim and ex technique  Pt verbalized/demonstrated good understanding:     [x] Yes         [] No, pt required further clarification. Post Treatment Pain: 5 /10      Plan  Times per week: 2  Plan weeks: 6      Goals  (Total # of Visits to Date: 13)      Short term goals  Time Frame for Short term goals: 3 weeks  Short term goal 1: Pt to be instructed in home program. -met  Short term goal 2: Pt to begin core strengthening ex for improved lumbar stability. -met    Long term goals  Time Frame for Long term goals : 6 weeks  Long term goal 1: Pt to report independence and compliance with home program.- met  Long term goal 2: Pt to complete TUG without AD in less than 17.0 seconds indicating improved gait and stability. - met  Long term goal 3: Pt to achieve 4/5 strength bilateral hips to assist with functional tasks. - progressing  Long term goal 4: Pt to score no greater than 63 on [place] Back Pain Disability Scale indicating improved quality of life.  - on going    Minutes Tracking:  Time In: 5143  Time Out: [address]  Minutes: Dominick Russell 141, PTA     Date: 3/7/2022

## 2022-03-09 ENCOUNTER — HOSPITAL ENCOUNTER (OUTPATIENT)
Dept: PHYSICAL THERAPY | Age: 77
Setting detail: THERAPIES SERIES
Discharge: HOME | End: 2022-03-09
Payer: MEDICARE

## 2022-03-09 PROCEDURE — 97113 AQUATIC THERAPY/EXERCISES: CPT

## 2022-03-09 NOTE — PROGRESS NOTES
Phone: [address] [facility]           Fax: [phone]                           Outpatient Physical Therapy                                                                            Daily Note    Patient: Paco See : 1945  CSN #: [id number]   Referring Practitioner:  Homero Garza DO    Referral Date : 12/15/21     Date: 3/9/2022    Diagnosis: Lumbar scoliosis, M41.9; Other intervertebral disc degeneration, lumbar region, M51.36  Treatment Diagnosis: Low back pain    Onset Date: 21  PT Insurance Information: Medicare  Total # of Visits Approved: 18 Per Physician Order  Total # of Visits to Date: 16  No Show: 1  Canceled Appointment: 3      Pre-Treatment Pain:  5/10  Subjective: Pt rates low back a /10. States she had injections \"across my low back on the left side yesterday. \"  Pt states she is unsure if she feels relief from injections yet. Exercises:  Exercise 1: **HEP - sitting trunk flexion, LTR, SKTC, bridge with Tband, palloff press, hip stretches  Exercise 2: Pool:fwd and retro walk fast enid using push plate x4 laps  Exercise 3: Pool:  step stretches 10\" x5; FSU/LSU/SD (water at knee level) x15 ea  Exercise 4: Pool:  sink ex in 3' x15  Exercise 5: Pool:  water walk fwd, retro x3 laps deep water today  Exercise 12: AQ;  fwd kick/side kick and marching with contralateral arm swing with paddles. x10  Exercise 13: AQ; side step with BUE abd x4 laps    Assessment  Assessment: Progressed HEP for pt and told to report feedback at next appt prior to d/c. Pt reports understanding to HEP. Tolerates current aquatic program well for increased strength/ROM. Encouraged pt to trial massage to decrease trigger points in low back.     Activity Tolerance  Activity Tolerance: Patient Tolerated treatment well    Patient Education  Ex technique, HEP,   Pt verbalized/demonstrated good understanding:     [x] Yes         [] No, pt required further clarification. Post Treatment Pain:  5/10      Plan  Times per week: 2  Plan weeks: 6      Goals  (Total # of Visits to Date: 12)      Short term goals  Time Frame for Short term goals: 3 weeks  Short term goal 1: Pt to be instructed in home program. -met  Short term goal 2: Pt to begin core strengthening ex for improved lumbar stability. -met    Long term goals  Time Frame for Long term goals : 6 weeks  Long term goal 1: Pt to report independence and compliance with home program.- met  Long term goal 2: Pt to complete TUG without AD in less than 17.0 seconds indicating improved gait and stability. - met  Long term goal 3: Pt to achieve 4/5 strength bilateral hips to assist with functional tasks. - progressing  Long term goal 4: Pt to score no greater than 63 on [place] Back Pain Disability Scale indicating improved quality of life.  - on going    Minutes Tracking:  Time In: 8358  Time Out: [address],[address]  Minutes: Handy 45, PTA     Date: 3/9/2022

## 2022-03-14 ENCOUNTER — APPOINTMENT (OUTPATIENT)
Dept: PHYSICAL THERAPY | Age: 77
End: 2022-03-14
Payer: MEDICARE

## 2022-03-14 ENCOUNTER — HOSPITAL ENCOUNTER (OUTPATIENT)
Dept: PHYSICAL THERAPY | Age: 77
Setting detail: THERAPIES SERIES
Discharge: HOME | End: 2022-03-14
Payer: MEDICARE

## 2022-03-14 PROCEDURE — 97113 AQUATIC THERAPY/EXERCISES: CPT

## 2022-03-14 ASSESSMENT — PAIN SCALES - QUEBEC BACK PAIN DISABILITY SCALE
WALK A FEW BLOCKS OR 300 TO 400M: 5
REACH UP TO HIGH SHELVES: 2
RUN ONE BLOCK OR 100M: 5
QUEBEC DISABILITY INDEX: 40-59%
MAKE YOUR BED: 1
MOVE A CHAIR: 2
BEND OVER TO CLEAN THE BATHTUB: 2
WALK SEVERAL KILOMETERS  OR MILES: 5
RIDE IN A CAR: 1
TOTAL SCORE: 52
GET OUT OF BED: 1
TURN OVER IN BED: 2
THROW A BALL: 5
PUT ON SOCKS OR PANYHOSE: 2
TAKE FOOD OUT OF THE REFRIGERATOR: 1
STAND UP FOR 20 TO 30 MINUTES: 2
QUEBEC CMS MODIFIER: CK
SLEEP THROUGH THE NIGHT: 2
CLIMB ONE FLIGHT OF STAIRS: 3
PULL OR PUSH HEAVY DOORS: 5
SIT IN A CHAIR FOR SEVERAL HOURS: 2
CARRY TWO BAGS OF GROCERIES: 2
LIFT AND CARRY A HEAVY SUITCASE: 2

## 2022-03-14 NOTE — PROGRESS NOTES
Phone: [facility]           Fax: [phone]                           Outpatient Physical Therapy                                                                            Daily Note    Patient: Damaris Rust : 1945  CSN #: [id number]   Referring Practitioner:  Saurav Steele DO    Referral Date : 12/15/21     Date: 3/14/2022    Diagnosis: Lumbar scoliosis, M41.9; Other intervertebral disc degeneration, lumbar region, M51.36  Treatment Diagnosis: Low back pain    Onset Date: 21  PT Insurance Information: Medicare  Total # of Visits Approved: 18 Per Physician Order  Total # of Visits to Date: 17  No Show: 1  Canceled Appointment: 3      Pre-Treatment Pain:  5/10  Subjective: Pt with no new complaints. Pain still rates about 5/10 in low back. Today is last scheduled visit for PT. Please with progress and agrees to continue with HEP. Exercises:  Exercise 2: Pool:fwd and retro walk fast enid using push plate x4 laps  Exercise 3: Pool:  step stretches 10\" x5; FSU/LSU/SD (water at knee level) x15 ea  Exercise 5: Pool:  water walk fwd, retro x3 laps deep water today  Exercise 6: Pool:  Blue float push down x15 ea side  Exercise 8: Pool: sit to stand x15  Exercise 9: AQ:  wall slide 5\" hold x10  Exercise 13: AQ; side step with BUE abd x4 laps  Exercise 15: Squat while keeping float pushed under water x10      Assessment  Assessment: Pt has completed 17 PT sessions for low back pain. TUG without AD: 14.55s, 14.99s, and 15.00s. [place] Back Pain Disability Scale 52. Strength bilateral hips currently 4 to 4+/5. Pt declined free open swim passes; we will now discharge to home program.    Activity Tolerance  Activity Tolerance: Patient Tolerated treatment well    Patient Education  Patient Education: continue HEP and discharge  Pt verbalized/demonstrated good understanding:     [x] Yes         [] No, pt required further clarification.        Post Treatment Pain:  4/10      Plan  Times per week: 2  Plan weeks: 6      Goals  (Total # of Visits to Date: 16)      Short term goals  Time Frame for Short term goals: 3 weeks  Short term goal 1: Pt to be instructed in home program. -met  Short term goal 2: Pt to begin core strengthening ex for improved lumbar stability. -met    Long term goals  Time Frame for Long term goals : 6 weeks  Long term goal 1: Pt to report independence and compliance with home program.- met  Long term goal 2: Pt to complete TUG without AD in less than 17.0 seconds indicating improved gait and stability. - met  Long term goal 3: Pt to achieve 4/5 strength bilateral hips to assist with functional tasks. - met  Long term goal 4: Pt to score no greater than 63 on [place] Back Pain Disability Scale indicating improved quality of life.  - met (46)    Minutes Tracking:  Time In: 8784  Time Out: 7998  Minutes: 55  Timed Code Treatment Minutes: [address] Her PT , DPT, CMPT      Date: 3/14/2022

## 2022-03-14 NOTE — DISCHARGE SUMMARY
Phone: Darius          Fax: [phone]                            Outpatient Physical Therapy                                                                    Discharge Summary    Patient: Anton Monsalve  : 1945  [facility] #: [id number]   Referring physician: No admitting provider for patient encounter. Referring Practitioner: Conrado Butcher DO      Diagnosis: Lumbar scoliosis, M41.9; Other intervertebral disc degeneration, lumbar region, M51.36      Date Treatment Initiated: 21  Date of Last Treatment: 3/14/22      PT Visit Information  Onset Date: 21  PT Insurance Information: Medicare  Total # of Visits Approved: 18  Total # of Visits to Date: 16  Plan of Care/Certification Expiration Date: 22  No Show: 1  Canceled Appointment: 3      Frequency/Duration  2 times per week  6 weeks      Treatment Received  [x] HP/CP      [] Electrical Stim   [x] Therapeutic Exercise      [] Gait Training  [x] Aquatics   [] Ultrasound         [x] Patient Education/HEP   [] Manual Therapy  [] Traction    [] Neuro-laci        [] Soft Tissue Mobs            [] Home TENS  [] Iontophoresis    [] Orthotic casting/fitting      [] Dry Needling    Assessment  Assessment: Pt has completed 17 PT sessions for low back pain. TUG without AD: 14.55s, 14.99s, and 15.00s. [place] Back Pain Disability Scale 52. Strength bilateral hips currently 4 to 4+/5. Pt declined free open swim passes; we will now discharge to home program.       Goals  Short term goals  Time Frame for Short term goals: 3 weeks  Short term goal 1: Pt to be instructed in home program. -met  Short term goal 2: Pt to begin core strengthening ex for improved lumbar stability.  -met    Long term goals  Time Frame for Long term goals : 6 weeks  Long term goal 1: Pt to report independence and compliance with home program.- met  Long term goal 2: Pt to complete TUG without AD in less than 17.0 seconds indicating improved gait and stability. - met  Long term goal 3: Pt to achieve 4/5 strength bilateral hips to assist with functional tasks. - met  Long term goal 4: Pt to score no greater than 63 on [place] Back Pain Disability Scale indicating improved quality of life.  - met (52)      Reason for Discharge  [x] Goals Achieved                        []  Poor Follow Through/Attendance                  []  Optimal Function Achieved     []  Patient Discharged Self    []  Hospitalization                         []  Physician discharge      Thank you for this referral      Inderjit Dutton, PT, DPT, CMPT               Date: 3/14/2022

## 2022-03-16 ENCOUNTER — APPOINTMENT (OUTPATIENT)
Dept: PHYSICAL THERAPY | Age: 77
End: 2022-03-16
Payer: MEDICARE

## 2023-08-01 ENCOUNTER — APPOINTMENT (OUTPATIENT)
Dept: GENERAL RADIOLOGY | Age: 78
End: 2023-08-01
Payer: MEDICARE

## 2023-08-01 ENCOUNTER — APPOINTMENT (OUTPATIENT)
Dept: CT IMAGING | Age: 78
End: 2023-08-01
Payer: MEDICARE

## 2023-08-01 ENCOUNTER — HOSPITAL ENCOUNTER (EMERGENCY)
Age: 78
Discharge: ANOTHER HOSPITAL | End: 2023-08-01
Payer: MEDICARE

## 2023-08-01 VITALS
OXYGEN SATURATION: 100 % | WEIGHT: 145 LBS | SYSTOLIC BLOOD PRESSURE: 145 MMHG | BODY MASS INDEX: 25.69 KG/M2 | DIASTOLIC BLOOD PRESSURE: 54 MMHG | HEART RATE: 86 BPM | RESPIRATION RATE: 24 BRPM | HEIGHT: 63 IN | TEMPERATURE: 97.6 F

## 2023-08-01 DIAGNOSIS — S22.41XA CLOSED FRACTURE OF MULTIPLE RIBS OF RIGHT SIDE, INITIAL ENCOUNTER: Primary | ICD-10-CM

## 2023-08-01 LAB
ALBUMIN SERPL-MCNC: 3.5 G/DL (ref 3.5–5.2)
ALBUMIN/GLOB SERPL: 1.1 {RATIO} (ref 1–2.5)
ALP SERPL-CCNC: 119 U/L (ref 35–104)
ALT SERPL-CCNC: 44 U/L (ref 5–33)
ANION GAP SERPL CALCULATED.3IONS-SCNC: 9 MMOL/L (ref 9–17)
AST SERPL-CCNC: 72 U/L
BASOPHILS # BLD: 0 K/UL (ref 0–0.2)
BASOPHILS NFR BLD: 0 % (ref 0–2)
BILIRUB SERPL-MCNC: 0.3 MG/DL (ref 0.3–1.2)
BUN SERPL-MCNC: 20 MG/DL (ref 8–23)
BUN/CREAT SERPL: 20 (ref 9–20)
CALCIUM SERPL-MCNC: 9 MG/DL (ref 8.6–10.4)
CHLORIDE SERPL-SCNC: 102 MMOL/L (ref 98–107)
CO2 SERPL-SCNC: 26 MMOL/L (ref 20–31)
CREAT SERPL-MCNC: 1 MG/DL (ref 0.5–0.9)
EOSINOPHIL # BLD: 0 K/UL (ref 0–0.44)
EOSINOPHILS RELATIVE PERCENT: 0 % (ref 1–4)
ERYTHROCYTE [DISTWIDTH] IN BLOOD BY AUTOMATED COUNT: 14.1 % (ref 11.8–14.4)
GFR SERPL CREATININE-BSD FRML MDRD: 58 ML/MIN/1.73M2
GLUCOSE SERPL-MCNC: 156 MG/DL (ref 70–99)
HCT VFR BLD AUTO: 38.3 % (ref 36.3–47.1)
HGB BLD-MCNC: 12.3 G/DL (ref 11.9–15.1)
IMM GRANULOCYTES # BLD AUTO: 0 K/UL (ref 0–0.3)
IMM GRANULOCYTES NFR BLD: 0 %
LYMPHOCYTES NFR BLD: 3.98 K/UL (ref 1.1–3.7)
LYMPHOCYTES RELATIVE PERCENT: 17 % (ref 24–43)
MCH RBC QN AUTO: 27.9 PG (ref 25.2–33.5)
MCHC RBC AUTO-ENTMCNC: 32.1 G/DL (ref 28.4–34.8)
MCV RBC AUTO: 86.8 FL (ref 82.6–102.9)
MONOCYTES NFR BLD: 1.64 K/UL (ref 0.1–1.2)
MONOCYTES NFR BLD: 7 % (ref 3–12)
MORPHOLOGY: ABNORMAL
MORPHOLOGY: ABNORMAL
NEUTROPHILS NFR BLD: 76 % (ref 36–65)
NEUTS SEG NFR BLD: 17.78 K/UL (ref 1.5–8.1)
NRBC BLD-RTO: 0 PER 100 WBC
PLATELET # BLD AUTO: 368 K/UL (ref 138–453)
PMV BLD AUTO: 9.8 FL (ref 8.1–13.5)
POTASSIUM SERPL-SCNC: 3.8 MMOL/L (ref 3.7–5.3)
PROT SERPL-MCNC: 6.8 G/DL (ref 6.4–8.3)
RBC # BLD AUTO: 4.41 M/UL (ref 3.95–5.11)
SODIUM SERPL-SCNC: 137 MMOL/L (ref 135–144)
WBC OTHER # BLD: 23.4 K/UL (ref 3.5–11.3)

## 2023-08-01 PROCEDURE — 96374 THER/PROPH/DIAG INJ IV PUSH: CPT

## 2023-08-01 PROCEDURE — 70450 CT HEAD/BRAIN W/O DYE: CPT

## 2023-08-01 PROCEDURE — 80053 COMPREHEN METABOLIC PANEL: CPT

## 2023-08-01 PROCEDURE — 72170 X-RAY EXAM OF PELVIS: CPT

## 2023-08-01 PROCEDURE — 73030 X-RAY EXAM OF SHOULDER: CPT

## 2023-08-01 PROCEDURE — 96376 TX/PRO/DX INJ SAME DRUG ADON: CPT

## 2023-08-01 PROCEDURE — 36415 COLL VENOUS BLD VENIPUNCTURE: CPT

## 2023-08-01 PROCEDURE — 2580000003 HC RX 258: Performed by: PHYSICIAN ASSISTANT

## 2023-08-01 PROCEDURE — 72125 CT NECK SPINE W/O DYE: CPT

## 2023-08-01 PROCEDURE — 85025 COMPLETE CBC W/AUTO DIFF WBC: CPT

## 2023-08-01 PROCEDURE — 74176 CT ABD & PELVIS W/O CONTRAST: CPT

## 2023-08-01 PROCEDURE — 93005 ELECTROCARDIOGRAM TRACING: CPT | Performed by: PHYSICIAN ASSISTANT

## 2023-08-01 PROCEDURE — 71250 CT THORAX DX C-: CPT

## 2023-08-01 PROCEDURE — 99285 EMERGENCY DEPT VISIT HI MDM: CPT

## 2023-08-01 PROCEDURE — 6360000002 HC RX W HCPCS: Performed by: PHYSICIAN ASSISTANT

## 2023-08-01 PROCEDURE — 71045 X-RAY EXAM CHEST 1 VIEW: CPT

## 2023-08-01 PROCEDURE — 96375 TX/PRO/DX INJ NEW DRUG ADDON: CPT

## 2023-08-01 RX ORDER — MORPHINE SULFATE 2 MG/ML
2 INJECTION, SOLUTION INTRAMUSCULAR; INTRAVENOUS ONCE
Status: COMPLETED | OUTPATIENT
Start: 2023-08-01 | End: 2023-08-01

## 2023-08-01 RX ORDER — ONDANSETRON 2 MG/ML
4 INJECTION INTRAMUSCULAR; INTRAVENOUS ONCE
Status: COMPLETED | OUTPATIENT
Start: 2023-08-01 | End: 2023-08-01

## 2023-08-01 RX ORDER — 0.9 % SODIUM CHLORIDE 0.9 %
1000 INTRAVENOUS SOLUTION INTRAVENOUS ONCE
Status: COMPLETED | OUTPATIENT
Start: 2023-08-01 | End: 2023-08-01

## 2023-08-01 RX ADMIN — MORPHINE SULFATE 2 MG: 2 INJECTION, SOLUTION INTRAMUSCULAR; INTRAVENOUS at 17:09

## 2023-08-01 RX ADMIN — ONDANSETRON 4 MG: 2 INJECTION INTRAMUSCULAR; INTRAVENOUS at 17:09

## 2023-08-01 RX ADMIN — SODIUM CHLORIDE 1000 ML: 9 INJECTION, SOLUTION INTRAVENOUS at 20:46

## 2023-08-01 RX ADMIN — MORPHINE SULFATE 2 MG: 2 INJECTION, SOLUTION INTRAMUSCULAR; INTRAVENOUS at 19:32

## 2023-08-01 ASSESSMENT — ENCOUNTER SYMPTOMS
GASTROINTESTINAL NEGATIVE: 1
SHORTNESS OF BREATH: 0
RESPIRATORY NEGATIVE: 1

## 2023-08-01 ASSESSMENT — PAIN DESCRIPTION - LOCATION
LOCATION: GENERALIZED
LOCATION: SHOULDER;HIP;HEAD

## 2023-08-01 ASSESSMENT — PAIN SCALES - GENERAL
PAINLEVEL_OUTOF10: 10
PAINLEVEL_OUTOF10: 10

## 2023-08-01 ASSESSMENT — PAIN DESCRIPTION - FREQUENCY: FREQUENCY: CONTINUOUS

## 2023-08-01 ASSESSMENT — PAIN - FUNCTIONAL ASSESSMENT: PAIN_FUNCTIONAL_ASSESSMENT: 0-10

## 2023-08-01 ASSESSMENT — PAIN DESCRIPTION - ORIENTATION
ORIENTATION: RIGHT
ORIENTATION: RIGHT

## 2023-08-01 ASSESSMENT — PAIN DESCRIPTION - PAIN TYPE: TYPE: ACUTE PAIN

## 2023-08-01 NOTE — ED PROVIDER NOTES
CONTRAST   Final Result   No acute fracture or traumatic malalignment of the cervical spine. CT HEAD WO CONTRAST   Final Result   No acute intracranial abnormality. Mild generalized atrophy and chronic ischemic changes as above. XR SHOULDER RIGHT (MIN 2 VIEWS)   Final Result   Acute fracture of the mid right clavicle with mild inferior displacement of   the fracture apex. Acute nondisplaced fractures of the right 3rd through 7th ribs. XR PELVIS (1-2 VIEWS)   Final Result   No definite or displaced fracture on this single view of the pelvis. As   noted, subtle impacted fracture right hip not excluded; if suspected   clinically, consider two-view study of the right hip or CT. Additional   findings, as above. XR CHEST PORTABLE   Final Result   Minimally displaced posterior right 5th rib fracture. No pneumothorax is seen. ED BEDSIDE ULTRASOUND:   Performed by ED Physician - none    LABS:  Labs Reviewed   CBC WITH AUTO DIFFERENTIAL - Abnormal; Notable for the following components:       Result Value    WBC 23.4 (*)     Neutrophils % 76 (*)     Lymphocytes % 17 (*)     Eosinophils % 0 (*)     Neutrophils Absolute 17.78 (*)     Lymphocytes Absolute 3.98 (*)     Monocytes Absolute 1.64 (*)     All other components within normal limits   COMPREHENSIVE METABOLIC PANEL - Abnormal; Notable for the following components:    Glucose 156 (*)     Creatinine 1.0 (*)     Est, Glom Filt Rate 58 (*)     Alkaline Phosphatase 119 (*)     ALT 44 (*)     AST 72 (*)     All other components within normal limits       All other labs were within normal range or not returned as of this dictation.     EMERGENCY DEPARTMENT COURSE and DIFFERENTIAL DIAGNOSIS/MDM:   Vitals:    Vitals:    08/01/23 1806 08/01/23 1935 08/01/23 2010 08/01/23 2028   BP: (!) 140/46 (!) 133/45  (!) 137/41   Pulse:       Resp:       Temp:       TempSrc:       SpO2: 93% 94% 99% 100%   Weight:       Height:

## 2023-08-02 LAB
EKG ATRIAL RATE: 83 BPM
EKG P AXIS: 46 DEGREES
EKG P-R INTERVAL: 142 MS
EKG Q-T INTERVAL: 388 MS
EKG QRS DURATION: 66 MS
EKG QTC CALCULATION (BAZETT): 455 MS
EKG R AXIS: 5 DEGREES
EKG T AXIS: 24 DEGREES
EKG VENTRICULAR RATE: 83 BPM

## 2023-08-02 PROCEDURE — 93010 ELECTROCARDIOGRAM REPORT: CPT | Performed by: INTERNAL MEDICINE

## 2023-08-02 NOTE — ED NOTES
Called to update brother Luis Singh at this time.  Report given to transport crew as well as receiving hospital.      Karena Melo RN  08/01/23 9711

## 2023-08-14 ENCOUNTER — HOSPITAL ENCOUNTER (OUTPATIENT)
Age: 78
Setting detail: SPECIMEN
Discharge: HOME | End: 2023-08-14
Payer: MEDICARE

## 2023-08-14 LAB
ALBUMIN SERPL-MCNC: 3 G/DL (ref 3.5–5.2)
ALBUMIN/GLOB SERPL: 0.9 {RATIO} (ref 1–2.5)
ALP SERPL-CCNC: 223 U/L (ref 35–104)
ALT SERPL-CCNC: 16 U/L (ref 5–33)
ANION GAP SERPL CALCULATED.3IONS-SCNC: 9 MMOL/L (ref 9–17)
AST SERPL-CCNC: 26 U/L
BASOPHILS # BLD: 0.1 K/UL (ref 0–0.2)
BASOPHILS NFR BLD: 1 % (ref 0–2)
BILIRUB SERPL-MCNC: 0.4 MG/DL (ref 0.3–1.2)
BUN SERPL-MCNC: 14 MG/DL (ref 8–23)
BUN/CREAT SERPL: 18 (ref 9–20)
CALCIUM SERPL-MCNC: 9.4 MG/DL (ref 8.6–10.4)
CHLORIDE SERPL-SCNC: 103 MMOL/L (ref 98–107)
CO2 SERPL-SCNC: 26 MMOL/L (ref 20–31)
CREAT SERPL-MCNC: 0.8 MG/DL (ref 0.5–0.9)
EOSINOPHIL # BLD: 0.5 K/UL (ref 0–0.44)
EOSINOPHILS RELATIVE PERCENT: 5 % (ref 1–4)
ERYTHROCYTE [DISTWIDTH] IN BLOOD BY AUTOMATED COUNT: 16.3 % (ref 11.8–14.4)
GFR SERPL CREATININE-BSD FRML MDRD: >60 ML/MIN/1.73M2
GLUCOSE P FAST SERPL-MCNC: 112 MG/DL (ref 70–99)
HCT VFR BLD AUTO: 32.2 % (ref 36.3–47.1)
HGB BLD-MCNC: 10.3 G/DL (ref 11.9–15.1)
IMM GRANULOCYTES # BLD AUTO: 0.1 K/UL (ref 0–0.3)
IMM GRANULOCYTES NFR BLD: 1 %
LYMPHOCYTES # BLD: 18 % (ref 24–43)
LYMPHOCYTES NFR BLD: 1.8 K/UL (ref 1.1–3.7)
MCH RBC QN AUTO: 28.7 PG (ref 25.2–33.5)
MCHC RBC AUTO-ENTMCNC: 32 G/DL (ref 28.4–34.8)
MCV RBC AUTO: 89.7 FL (ref 82.6–102.9)
MONOCYTES NFR BLD: 1.1 K/UL (ref 0.1–1.2)
MONOCYTES NFR BLD: 11 % (ref 3–12)
MORPHOLOGY: NORMAL
NEUTROPHILS NFR BLD: 64 % (ref 36–65)
NEUTS SEG NFR BLD: 6.4 K/UL (ref 1.5–8.1)
NRBC BLD-RTO: 0 PER 100 WBC
PLATELET # BLD AUTO: 416 K/UL (ref 138–453)
PMV BLD AUTO: 9.5 FL (ref 8.1–13.5)
POTASSIUM SERPL-SCNC: 4.4 MMOL/L (ref 3.7–5.3)
PROT SERPL-MCNC: 6.5 G/DL (ref 6.4–8.3)
RBC # BLD AUTO: 3.59 M/UL (ref 3.95–5.11)
SODIUM SERPL-SCNC: 138 MMOL/L (ref 135–144)
WBC OTHER # BLD: 10 K/UL (ref 3.5–11.3)

## 2023-08-14 PROCEDURE — 36415 COLL VENOUS BLD VENIPUNCTURE: CPT

## 2023-08-14 PROCEDURE — P9604 ONE-WAY ALLOW PRORATED TRIP: HCPCS

## 2023-08-14 PROCEDURE — 85025 COMPLETE CBC W/AUTO DIFF WBC: CPT

## 2023-08-14 PROCEDURE — 80053 COMPREHEN METABOLIC PANEL: CPT

## 2023-08-21 ENCOUNTER — HOSPITAL ENCOUNTER (OUTPATIENT)
Age: 78
Setting detail: SPECIMEN
Discharge: HOME | End: 2023-08-21

## 2023-08-21 LAB
ALBUMIN SERPL-MCNC: 3.1 G/DL (ref 3.5–5.2)
ALBUMIN/GLOB SERPL: 1 {RATIO} (ref 1–2.5)
ALP SERPL-CCNC: 219 U/L (ref 35–104)
ALT SERPL-CCNC: 15 U/L (ref 5–33)
ANION GAP SERPL CALCULATED.3IONS-SCNC: 10 MMOL/L (ref 9–17)
AST SERPL-CCNC: 20 U/L
BASOPHILS # BLD: 0.05 K/UL (ref 0–0.2)
BASOPHILS NFR BLD: 1 % (ref 0–2)
BILIRUB SERPL-MCNC: 0.3 MG/DL (ref 0.3–1.2)
BUN SERPL-MCNC: 18 MG/DL (ref 8–23)
BUN/CREAT SERPL: 23 (ref 9–20)
CALCIUM SERPL-MCNC: 8.9 MG/DL (ref 8.6–10.4)
CHLORIDE SERPL-SCNC: 104 MMOL/L (ref 98–107)
CO2 SERPL-SCNC: 25 MMOL/L (ref 20–31)
CREAT SERPL-MCNC: 0.8 MG/DL (ref 0.5–0.9)
EOSINOPHIL # BLD: 0.63 K/UL (ref 0–0.44)
EOSINOPHILS RELATIVE PERCENT: 8 % (ref 1–4)
ERYTHROCYTE [DISTWIDTH] IN BLOOD BY AUTOMATED COUNT: 15.6 % (ref 11.8–14.4)
GFR SERPL CREATININE-BSD FRML MDRD: >60 ML/MIN/1.73M2
GLUCOSE SERPL-MCNC: 110 MG/DL (ref 70–99)
HCT VFR BLD AUTO: 33.7 % (ref 36.3–47.1)
HGB BLD-MCNC: 10.6 G/DL (ref 11.9–15.1)
IMM GRANULOCYTES # BLD AUTO: <0.03 K/UL (ref 0–0.3)
IMM GRANULOCYTES NFR BLD: 0 %
LYMPHOCYTES # BLD: 25 % (ref 24–43)
LYMPHOCYTES NFR BLD: 2.07 K/UL (ref 1.1–3.7)
MCH RBC QN AUTO: 28.3 PG (ref 25.2–33.5)
MCHC RBC AUTO-ENTMCNC: 31.5 G/DL (ref 28.4–34.8)
MCV RBC AUTO: 90.1 FL (ref 82.6–102.9)
MONOCYTES NFR BLD: 0.88 K/UL (ref 0.1–1.2)
MONOCYTES NFR BLD: 11 % (ref 3–12)
NEUTROPHILS NFR BLD: 55 % (ref 36–65)
NEUTS SEG NFR BLD: 4.53 K/UL (ref 1.5–8.1)
NRBC BLD-RTO: 0 PER 100 WBC
PLATELET # BLD AUTO: 546 K/UL (ref 138–453)
PMV BLD AUTO: 9.4 FL (ref 8.1–13.5)
POTASSIUM SERPL-SCNC: 4.1 MMOL/L (ref 3.7–5.3)
PROT SERPL-MCNC: 6.2 G/DL (ref 6.4–8.3)
RBC # BLD AUTO: 3.74 M/UL (ref 3.95–5.11)
SODIUM SERPL-SCNC: 139 MMOL/L (ref 135–144)
WBC OTHER # BLD: 8.2 K/UL (ref 3.5–11.3)

## 2023-08-21 PROCEDURE — 85025 COMPLETE CBC W/AUTO DIFF WBC: CPT

## 2023-08-21 PROCEDURE — P9604 ONE-WAY ALLOW PRORATED TRIP: HCPCS

## 2023-08-21 PROCEDURE — 36415 COLL VENOUS BLD VENIPUNCTURE: CPT

## 2023-08-21 PROCEDURE — 80053 COMPREHEN METABOLIC PANEL: CPT

## 2023-08-28 ENCOUNTER — HOSPITAL ENCOUNTER (OUTPATIENT)
Age: 78
Setting detail: SPECIMEN
Discharge: HOME | End: 2023-08-28

## 2023-08-28 LAB
ALBUMIN SERPL-MCNC: 3.3 G/DL (ref 3.5–5.2)
ALBUMIN/GLOB SERPL: 1.1 {RATIO} (ref 1–2.5)
ALP SERPL-CCNC: 225 U/L (ref 35–104)
ALT SERPL-CCNC: 17 U/L (ref 5–33)
ANION GAP SERPL CALCULATED.3IONS-SCNC: 9 MMOL/L (ref 9–17)
AST SERPL-CCNC: 24 U/L
BASOPHILS # BLD: 0.05 K/UL (ref 0–0.2)
BASOPHILS NFR BLD: 1 % (ref 0–2)
BILIRUB SERPL-MCNC: 0.2 MG/DL (ref 0.3–1.2)
BUN SERPL-MCNC: 20 MG/DL (ref 8–23)
BUN/CREAT SERPL: 22 (ref 9–20)
CALCIUM SERPL-MCNC: 9 MG/DL (ref 8.6–10.4)
CHLORIDE SERPL-SCNC: 104 MMOL/L (ref 98–107)
CO2 SERPL-SCNC: 27 MMOL/L (ref 20–31)
CREAT SERPL-MCNC: 0.9 MG/DL (ref 0.5–0.9)
EOSINOPHIL # BLD: 0.39 K/UL (ref 0–0.44)
EOSINOPHILS RELATIVE PERCENT: 6 % (ref 1–4)
ERYTHROCYTE [DISTWIDTH] IN BLOOD BY AUTOMATED COUNT: 15 % (ref 11.8–14.4)
GFR SERPL CREATININE-BSD FRML MDRD: >60 ML/MIN/1.73M2
GLUCOSE SERPL-MCNC: 118 MG/DL (ref 70–99)
HCT VFR BLD AUTO: 36.5 % (ref 36.3–47.1)
HGB BLD-MCNC: 11.9 G/DL (ref 11.9–15.1)
IMM GRANULOCYTES # BLD AUTO: <0.03 K/UL (ref 0–0.3)
IMM GRANULOCYTES NFR BLD: 0 %
LYMPHOCYTES # BLD: 38 % (ref 24–43)
LYMPHOCYTES NFR BLD: 2.42 K/UL (ref 1.1–3.7)
MCH RBC QN AUTO: 27.8 PG (ref 25.2–33.5)
MCHC RBC AUTO-ENTMCNC: 32.6 G/DL (ref 28.4–34.8)
MCV RBC AUTO: 85.3 FL (ref 82.6–102.9)
MONOCYTES NFR BLD: 0.58 K/UL (ref 0.1–1.2)
MONOCYTES NFR BLD: 9 % (ref 3–12)
NEUTROPHILS NFR BLD: 46 % (ref 36–65)
NEUTS SEG NFR BLD: 2.98 K/UL (ref 1.5–8.1)
NRBC BLD-RTO: 0 PER 100 WBC
PLATELET # BLD AUTO: 562 K/UL (ref 138–453)
PMV BLD AUTO: 9.5 FL (ref 8.1–13.5)
POTASSIUM SERPL-SCNC: 4.1 MMOL/L (ref 3.7–5.3)
PROT SERPL-MCNC: 6.4 G/DL (ref 6.4–8.3)
RBC # BLD AUTO: 4.28 M/UL (ref 3.95–5.11)
SODIUM SERPL-SCNC: 140 MMOL/L (ref 135–144)
WBC OTHER # BLD: 6.4 K/UL (ref 3.5–11.3)

## 2023-08-28 PROCEDURE — 85025 COMPLETE CBC W/AUTO DIFF WBC: CPT

## 2023-08-28 PROCEDURE — P9604 ONE-WAY ALLOW PRORATED TRIP: HCPCS

## 2023-08-28 PROCEDURE — 36415 COLL VENOUS BLD VENIPUNCTURE: CPT

## 2023-08-28 PROCEDURE — 80053 COMPREHEN METABOLIC PANEL: CPT

## 2023-09-05 ENCOUNTER — HOSPITAL ENCOUNTER (OUTPATIENT)
Age: 78
Setting detail: SPECIMEN
Discharge: HOME | End: 2023-09-05

## 2023-09-05 LAB
ALBUMIN SERPL-MCNC: 3.4 G/DL (ref 3.5–5.2)
ALBUMIN/GLOB SERPL: 1.2 {RATIO} (ref 1–2.5)
ALP SERPL-CCNC: 173 U/L (ref 35–104)
ALT SERPL-CCNC: 31 U/L (ref 5–33)
ANION GAP SERPL CALCULATED.3IONS-SCNC: 8 MMOL/L (ref 9–17)
AST SERPL-CCNC: 47 U/L
BASOPHILS # BLD: 0.05 K/UL (ref 0–0.2)
BASOPHILS NFR BLD: 1 % (ref 0–2)
BILIRUB SERPL-MCNC: 0.2 MG/DL (ref 0.3–1.2)
BUN SERPL-MCNC: 11 MG/DL (ref 8–23)
BUN/CREAT SERPL: 12 (ref 9–20)
CALCIUM SERPL-MCNC: 9.2 MG/DL (ref 8.6–10.4)
CHLORIDE SERPL-SCNC: 104 MMOL/L (ref 98–107)
CO2 SERPL-SCNC: 29 MMOL/L (ref 20–31)
CREAT SERPL-MCNC: 0.9 MG/DL (ref 0.5–0.9)
EOSINOPHIL # BLD: 0.34 K/UL (ref 0–0.44)
EOSINOPHILS RELATIVE PERCENT: 4 % (ref 1–4)
ERYTHROCYTE [DISTWIDTH] IN BLOOD BY AUTOMATED COUNT: 15 % (ref 11.8–14.4)
GFR SERPL CREATININE-BSD FRML MDRD: >60 ML/MIN/1.73M2
GLUCOSE SERPL-MCNC: 123 MG/DL (ref 70–99)
HCT VFR BLD AUTO: 37.5 % (ref 36.3–47.1)
HGB BLD-MCNC: 11.8 G/DL (ref 11.9–15.1)
IMM GRANULOCYTES # BLD AUTO: 0.03 K/UL (ref 0–0.3)
IMM GRANULOCYTES NFR BLD: 0 %
LYMPHOCYTES # BLD: 26 % (ref 24–43)
LYMPHOCYTES NFR BLD: 2.59 K/UL (ref 1.1–3.7)
MCH RBC QN AUTO: 27.4 PG (ref 25.2–33.5)
MCHC RBC AUTO-ENTMCNC: 31.5 G/DL (ref 28.4–34.8)
MCV RBC AUTO: 87.2 FL (ref 82.6–102.9)
MONOCYTES NFR BLD: 0.88 K/UL (ref 0.1–1.2)
MONOCYTES NFR BLD: 9 % (ref 3–12)
NEUTROPHILS NFR BLD: 60 % (ref 36–65)
NEUTS SEG NFR BLD: 5.96 K/UL (ref 1.5–8.1)
NRBC BLD-RTO: 0 PER 100 WBC
PLATELET # BLD AUTO: 441 K/UL (ref 138–453)
PMV BLD AUTO: 10.2 FL (ref 8.1–13.5)
POTASSIUM SERPL-SCNC: 4.4 MMOL/L (ref 3.7–5.3)
PROT SERPL-MCNC: 6.3 G/DL (ref 6.4–8.3)
RBC # BLD AUTO: 4.3 M/UL (ref 3.95–5.11)
SODIUM SERPL-SCNC: 141 MMOL/L (ref 135–144)
WBC OTHER # BLD: 9.9 K/UL (ref 3.5–11.3)

## 2023-09-05 PROCEDURE — 80053 COMPREHEN METABOLIC PANEL: CPT

## 2023-09-05 PROCEDURE — 85025 COMPLETE CBC W/AUTO DIFF WBC: CPT

## 2023-09-05 PROCEDURE — 36415 COLL VENOUS BLD VENIPUNCTURE: CPT

## 2024-08-23 ENCOUNTER — HOSPITAL ENCOUNTER (OUTPATIENT)
Dept: PREADMISSION TESTING | Age: 79
End: 2024-08-23
Payer: MEDICARE

## 2024-08-23 VITALS
BODY MASS INDEX: 27.23 KG/M2 | WEIGHT: 148 LBS | DIASTOLIC BLOOD PRESSURE: 54 MMHG | HEIGHT: 62 IN | SYSTOLIC BLOOD PRESSURE: 138 MMHG | RESPIRATION RATE: 14 BRPM | OXYGEN SATURATION: 96 % | TEMPERATURE: 97.2 F | HEART RATE: 63 BPM

## 2024-08-23 LAB
ALBUMIN SERPL-MCNC: 3.9 G/DL (ref 3.5–5.2)
ALBUMIN/GLOB SERPL: 1.3 {RATIO} (ref 1–2.5)
ALP SERPL-CCNC: 112 U/L (ref 35–104)
ALT SERPL-CCNC: 18 U/L (ref 5–33)
ANION GAP SERPL CALCULATED.3IONS-SCNC: 9 MMOL/L (ref 9–17)
AST SERPL-CCNC: 35 U/L
BILIRUB SERPL-MCNC: 0.3 MG/DL (ref 0.3–1.2)
BUN SERPL-MCNC: 16 MG/DL (ref 8–23)
BUN/CREAT SERPL: 15 (ref 9–20)
CALCIUM SERPL-MCNC: 9.3 MG/DL (ref 8.6–10.4)
CHLORIDE SERPL-SCNC: 104 MMOL/L (ref 98–107)
CO2 SERPL-SCNC: 29 MMOL/L (ref 20–31)
CREAT SERPL-MCNC: 1.1 MG/DL (ref 0.5–0.9)
EST. AVERAGE GLUCOSE BLD GHB EST-MCNC: 134 MG/DL
GFR, ESTIMATED: 52 ML/MIN/1.73M2
GLUCOSE SERPL-MCNC: 63 MG/DL (ref 70–99)
HBA1C MFR BLD: 6.3 % (ref 4–6)
POTASSIUM SERPL-SCNC: 5.1 MMOL/L (ref 3.7–5.3)
PROT SERPL-MCNC: 6.8 G/DL (ref 6.4–8.3)
SODIUM SERPL-SCNC: 142 MMOL/L (ref 135–144)

## 2024-08-23 PROCEDURE — 83036 HEMOGLOBIN GLYCOSYLATED A1C: CPT

## 2024-08-23 PROCEDURE — 80053 COMPREHEN METABOLIC PANEL: CPT

## 2024-08-23 PROCEDURE — 87641 MR-STAPH DNA AMP PROBE: CPT

## 2024-08-23 PROCEDURE — 93005 ELECTROCARDIOGRAM TRACING: CPT | Performed by: NURSE PRACTITIONER

## 2024-08-23 PROCEDURE — 36415 COLL VENOUS BLD VENIPUNCTURE: CPT

## 2024-08-23 RX ORDER — SODIUM CHLORIDE, SODIUM LACTATE, POTASSIUM CHLORIDE, CALCIUM CHLORIDE 600; 310; 30; 20 MG/100ML; MG/100ML; MG/100ML; MG/100ML
INJECTION, SOLUTION INTRAVENOUS CONTINUOUS
OUTPATIENT
Start: 2024-08-23

## 2024-08-23 RX ORDER — ACETAMINOPHEN 325 MG/1
650 TABLET ORAL ONCE
OUTPATIENT
Start: 2024-08-23 | End: 2024-08-23

## 2024-08-23 RX ORDER — TRANEXAMIC ACID 650 MG/1
1950 TABLET ORAL ONCE
OUTPATIENT
Start: 2024-08-23 | End: 2024-08-23

## 2024-08-23 RX ORDER — DIMENHYDRINATE 50 MG
50 TABLET ORAL
OUTPATIENT
Start: 2024-08-23 | End: 2024-08-23

## 2024-08-23 NOTE — PROGRESS NOTES
[facility]   Preadmission Testing    Name: Jahaira Maravilla  : 1945  Patient Phone: [phone] (home)     Procedure left TKA  Date of Procedure: 9/10/24  Surgeon: No att. providers found    Ht:  157.5 cm (5' 2\")  Wt: 67.1 kg (148 lb)  Wt method: Stated    Allergies:   Allergies   Allergen Reactions    Januvia [Sitagliptin]     Toviaz [Fesoterodine Fumarate Er]     Advicor [Niacin-Lovastatin Er] Rash    Lidoderm Rash    Neomycin Dermatitis    Sulfa Antibiotics Rash       Peanut allergy: No         Vitals:    24 0909   BP: (!) 138/54   Pulse: 63   Resp: 14   Temp: 97.2 °F (36.2 °C)   SpO2: 96%       No LMP recorded. Patient has had a hysterectomy.    Do you take blood thinners?   [x] Yes    [] No         Instructed to stop blood thinners prior to procedure?    [x] Yes    [] No      [] N/A   Do you have sleep apnea?   [] Yes    [x] No     Instructed to bring CPAP machine?   [] Yes    [] No    [x] N/A   Do you have acid reflux ?   [x] Yes    [] No     Do you have  hiatal hernia?   [] Yes    [x] No    Do you ever experience motion sickness?   [] Yes    [x] No     Have you had a respiratory infection or sore throat in last 4 weeks before surgery?    [] Yes    [x] No     Do you have poorly controlled asthma or COPD?   [] Yes    [x] No     Do you have a history of angina in the last month or symptomatic arrhythmia?   [] Yes    [x] No     Do you have significant central nervous system disease?   [] Yes    [x] No     Have you had an EKG, labs, or chest xray in last 12 months?  If yes provide copies to anesthesia   [] Yes    [x] No       [] Lab    [] EKG    [] CXR     Have you had a stress test?     [] Yes    [x] No    When/where:    Was it normal?    [] Yes    [] No   Do you or your family have a history of Malignant Hyperthermia? [] Yes    [x] No     Patient instructed on:   [x] NPO Status   [x] Meds to Take Day of Surgery  [x] Ride Home  [x]No Jewelry/Contact Lenses/Dentures day of  surgery   [x] Chlorhexidene             PAT Call/Visit Questions  Person Interviewed: Jahaira  Relationship to Patient: Patient  Surgery Time Verified: Yes  Surgery Location Verified: Yes  Ride Caregiver Provider: Brother Zuleima Duval  CHG Bottle/Wipes provided with instructions for use: Yes         Patient instructed on the pre-operative, intra-operative, and post-operative process?   Yes  Medication instructions reviewed with patient?  Yes  Pre operative instruction sheet reviewed and given to patient in PAT?  Yes

## 2024-08-24 LAB
MRSA, DNA, NASAL: NEGATIVE
SPECIMEN DESCRIPTION: NORMAL

## 2024-08-25 LAB
EKG ATRIAL RATE: 60 BPM
EKG P AXIS: 68 DEGREES
EKG P-R INTERVAL: 146 MS
EKG Q-T INTERVAL: 432 MS
EKG QRS DURATION: 74 MS
EKG QTC CALCULATION (BAZETT): 432 MS
EKG R AXIS: 9 DEGREES
EKG T AXIS: 49 DEGREES
EKG VENTRICULAR RATE: 60 BPM

## 2024-08-25 PROCEDURE — 93010 ELECTROCARDIOGRAM REPORT: CPT | Performed by: INTERNAL MEDICINE

## 2024-09-09 ENCOUNTER — ANESTHESIA EVENT (OUTPATIENT)
Dept: OPERATING ROOM | Age: 79
End: 2024-09-09
Payer: MEDICARE

## 2024-09-10 ENCOUNTER — ANESTHESIA (OUTPATIENT)
Dept: OPERATING ROOM | Age: 79
End: 2024-09-10
Payer: MEDICARE

## 2024-09-10 ENCOUNTER — APPOINTMENT (OUTPATIENT)
Dept: GENERAL RADIOLOGY | Age: 79
End: 2024-09-10
Attending: ORTHOPAEDIC SURGERY
Payer: MEDICARE

## 2024-09-10 ENCOUNTER — HOSPITAL ENCOUNTER (OUTPATIENT)
Age: 79
Setting detail: OBSERVATION
Discharge: HOME | End: 2024-09-11
Attending: ORTHOPAEDIC SURGERY | Admitting: ORTHOPAEDIC SURGERY
Payer: MEDICARE

## 2024-09-10 PROBLEM — M17.12 PRIMARY OSTEOARTHRITIS OF LEFT KNEE: Status: ACTIVE | Noted: 2024-09-10

## 2024-09-10 LAB
ANION GAP SERPL CALCULATED.3IONS-SCNC: 10 MMOL/L (ref 9–16)
BUN SERPL-MCNC: 13 MG/DL (ref 8–23)
BUN/CREAT SERPL: 12 (ref 9–20)
CALCIUM SERPL-MCNC: 9.2 MG/DL (ref 8.6–10.4)
CHLORIDE SERPL-SCNC: 105 MMOL/L (ref 98–107)
CO2 SERPL-SCNC: 27 MMOL/L (ref 20–31)
CREAT SERPL-MCNC: 1.1 MG/DL (ref 0.5–0.9)
GFR, ESTIMATED: 52 ML/MIN/1.73M2
GLUCOSE BLD-MCNC: 115 MG/DL (ref 74–100)
GLUCOSE BLD-MCNC: 247 MG/DL (ref 74–100)
GLUCOSE BLD-MCNC: 294 MG/DL (ref 74–100)
GLUCOSE SERPL-MCNC: 152 MG/DL (ref 74–99)
HCT VFR BLD AUTO: 42.7 % (ref 36.3–47.1)
HGB BLD-MCNC: 13.9 G/DL (ref 11.9–15.1)
POTASSIUM SERPL-SCNC: 4.4 MMOL/L (ref 3.7–5.3)
SODIUM SERPL-SCNC: 142 MMOL/L (ref 136–145)

## 2024-09-10 PROCEDURE — 85018 HEMOGLOBIN: CPT

## 2024-09-10 PROCEDURE — 80048 BASIC METABOLIC PNL TOTAL CA: CPT

## 2024-09-10 PROCEDURE — 94664 DEMO&/EVAL PT USE INHALER: CPT

## 2024-09-10 PROCEDURE — 6370000000 HC RX 637 (ALT 250 FOR IP): Performed by: NURSE PRACTITIONER

## 2024-09-10 PROCEDURE — 6360000002 HC RX W HCPCS

## 2024-09-10 PROCEDURE — 6360000002 HC RX W HCPCS: Performed by: NURSE PRACTITIONER

## 2024-09-10 PROCEDURE — 2580000003 HC RX 258: Performed by: NURSE ANESTHETIST, CERTIFIED REGISTERED

## 2024-09-10 PROCEDURE — 64447 NJX AA&/STRD FEMORAL NRV IMG: CPT | Performed by: NURSE ANESTHETIST, CERTIFIED REGISTERED

## 2024-09-10 PROCEDURE — 94761 N-INVAS EAR/PLS OXIMETRY MLT: CPT

## 2024-09-10 PROCEDURE — 2700000000 HC OXYGEN THERAPY PER DAY

## 2024-09-10 PROCEDURE — 6360000002 HC RX W HCPCS: Performed by: ORTHOPAEDIC SURGERY

## 2024-09-10 PROCEDURE — 2580000003 HC RX 258: Performed by: ORTHOPAEDIC SURGERY

## 2024-09-10 PROCEDURE — 97161 PT EVAL LOW COMPLEX 20 MIN: CPT

## 2024-09-10 PROCEDURE — 2580000003 HC RX 258

## 2024-09-10 PROCEDURE — 82947 ASSAY GLUCOSE BLOOD QUANT: CPT

## 2024-09-10 PROCEDURE — 97166 OT EVAL MOD COMPLEX 45 MIN: CPT

## 2024-09-10 PROCEDURE — 85014 HEMATOCRIT: CPT

## 2024-09-10 PROCEDURE — 73560 X-RAY EXAM OF KNEE 1 OR 2: CPT

## 2024-09-10 PROCEDURE — G0378 HOSPITAL OBSERVATION PER HR: HCPCS

## 2024-09-10 PROCEDURE — 6360000002 HC RX W HCPCS: Performed by: NURSE ANESTHETIST, CERTIFIED REGISTERED

## 2024-09-10 PROCEDURE — 6370000000 HC RX 637 (ALT 250 FOR IP): Performed by: ORTHOPAEDIC SURGERY

## 2024-09-10 PROCEDURE — 97116 GAIT TRAINING THERAPY: CPT

## 2024-09-10 PROCEDURE — 36415 COLL VENOUS BLD VENIPUNCTURE: CPT

## 2024-09-10 PROCEDURE — 2500000003 HC RX 250 WO HCPCS: Performed by: ORTHOPAEDIC SURGERY

## 2024-09-10 RX ORDER — PHENYLEPHRINE HYDROCHLORIDE 10 MG/ML
INJECTION INTRAVENOUS PRN
Status: DISCONTINUED | OUTPATIENT
Start: 2024-09-10 | End: 2024-09-10 | Stop reason: SDUPTHER

## 2024-09-10 RX ORDER — DULOXETIN HYDROCHLORIDE 60 MG/1
60 CAPSULE, DELAYED RELEASE ORAL DAILY
Status: DISCONTINUED | OUTPATIENT
Start: 2024-09-10 | End: 2024-09-11 | Stop reason: HOSPADM

## 2024-09-10 RX ORDER — MELOXICAM 15 MG/1
15 TABLET ORAL DAILY
Status: DISCONTINUED | OUTPATIENT
Start: 2024-09-10 | End: 2024-09-11 | Stop reason: HOSPADM

## 2024-09-10 RX ORDER — ROPIVACAINE HYDROCHLORIDE 5 MG/ML
INJECTION, SOLUTION EPIDURAL; INFILTRATION; PERINEURAL PRN
Status: DISCONTINUED | OUTPATIENT
Start: 2024-09-10 | End: 2024-09-10 | Stop reason: SDUPTHER

## 2024-09-10 RX ORDER — SODIUM CHLORIDE 9 MG/ML
INJECTION, SOLUTION INTRAVENOUS PRN
Status: DISCONTINUED | OUTPATIENT
Start: 2024-09-10 | End: 2024-09-10 | Stop reason: HOSPADM

## 2024-09-10 RX ORDER — MIDAZOLAM HYDROCHLORIDE 1 MG/ML
INJECTION INTRAMUSCULAR; INTRAVENOUS PRN
Status: DISCONTINUED | OUTPATIENT
Start: 2024-09-10 | End: 2024-09-10 | Stop reason: SDUPTHER

## 2024-09-10 RX ORDER — DIMENHYDRINATE 50 MG
50 TABLET ORAL
Status: COMPLETED | OUTPATIENT
Start: 2024-09-10 | End: 2024-09-10

## 2024-09-10 RX ORDER — CEFAZOLIN SODIUM IN 0.9 % NACL 2 G/100 ML
2000 PLASTIC BAG, INJECTION (ML) INTRAVENOUS ONCE
Status: COMPLETED | OUTPATIENT
Start: 2024-09-10 | End: 2024-09-10

## 2024-09-10 RX ORDER — FENTANYL CITRATE 50 UG/ML
50 INJECTION, SOLUTION INTRAMUSCULAR; INTRAVENOUS EVERY 5 MIN PRN
Status: DISCONTINUED | OUTPATIENT
Start: 2024-09-10 | End: 2024-09-10 | Stop reason: HOSPADM

## 2024-09-10 RX ORDER — DEXAMETHASONE SODIUM PHOSPHATE 10 MG/ML
INJECTION INTRAMUSCULAR; INTRAVENOUS PRN
Status: DISCONTINUED | OUTPATIENT
Start: 2024-09-10 | End: 2024-09-10 | Stop reason: SDUPTHER

## 2024-09-10 RX ORDER — OXYCODONE HYDROCHLORIDE 5 MG/1
10 TABLET ORAL EVERY 4 HOURS PRN
Status: DISCONTINUED | OUTPATIENT
Start: 2024-09-10 | End: 2024-09-11 | Stop reason: HOSPADM

## 2024-09-10 RX ORDER — ONDANSETRON 2 MG/ML
4 INJECTION INTRAMUSCULAR; INTRAVENOUS EVERY 6 HOURS PRN
Status: DISCONTINUED | OUTPATIENT
Start: 2024-09-10 | End: 2024-09-11 | Stop reason: HOSPADM

## 2024-09-10 RX ORDER — AMLODIPINE BESYLATE 5 MG/1
5 TABLET ORAL DAILY
Status: DISCONTINUED | OUTPATIENT
Start: 2024-09-11 | End: 2024-09-11 | Stop reason: HOSPADM

## 2024-09-10 RX ORDER — LOSARTAN POTASSIUM 50 MG/1
50 TABLET ORAL DAILY
Status: DISCONTINUED | OUTPATIENT
Start: 2024-09-10 | End: 2024-09-11 | Stop reason: HOSPADM

## 2024-09-10 RX ORDER — METOCLOPRAMIDE HYDROCHLORIDE 5 MG/ML
10 INJECTION INTRAMUSCULAR; INTRAVENOUS
Status: DISCONTINUED | OUTPATIENT
Start: 2024-09-10 | End: 2024-09-10 | Stop reason: HOSPADM

## 2024-09-10 RX ORDER — LABETALOL HYDROCHLORIDE 5 MG/ML
10 INJECTION, SOLUTION INTRAVENOUS
Status: DISCONTINUED | OUTPATIENT
Start: 2024-09-10 | End: 2024-09-10 | Stop reason: HOSPADM

## 2024-09-10 RX ORDER — SODIUM CHLORIDE 9 MG/ML
INJECTION, SOLUTION INTRAVENOUS PRN
Status: DISCONTINUED | OUTPATIENT
Start: 2024-09-10 | End: 2024-09-11 | Stop reason: HOSPADM

## 2024-09-10 RX ORDER — SODIUM CHLORIDE 0.9 % (FLUSH) 0.9 %
5-40 SYRINGE (ML) INJECTION PRN
Status: DISCONTINUED | OUTPATIENT
Start: 2024-09-10 | End: 2024-09-11 | Stop reason: HOSPADM

## 2024-09-10 RX ORDER — CEFAZOLIN SODIUM IN 0.9 % NACL 2 G/100 ML
2000 PLASTIC BAG, INJECTION (ML) INTRAVENOUS EVERY 8 HOURS
Status: COMPLETED | OUTPATIENT
Start: 2024-09-10 | End: 2024-09-11

## 2024-09-10 RX ORDER — NALOXONE HYDROCHLORIDE 0.4 MG/ML
INJECTION, SOLUTION INTRAMUSCULAR; INTRAVENOUS; SUBCUTANEOUS PRN
Status: DISCONTINUED | OUTPATIENT
Start: 2024-09-10 | End: 2024-09-10 | Stop reason: HOSPADM

## 2024-09-10 RX ORDER — SODIUM CHLORIDE 0.9 % (FLUSH) 0.9 %
5-40 SYRINGE (ML) INJECTION EVERY 12 HOURS SCHEDULED
Status: DISCONTINUED | OUTPATIENT
Start: 2024-09-10 | End: 2024-09-11 | Stop reason: HOSPADM

## 2024-09-10 RX ORDER — SODIUM CHLORIDE 9 MG/ML
INJECTION, SOLUTION INTRAVENOUS CONTINUOUS
Status: DISCONTINUED | OUTPATIENT
Start: 2024-09-10 | End: 2024-09-11 | Stop reason: HOSPADM

## 2024-09-10 RX ORDER — OXYCODONE HYDROCHLORIDE 5 MG/1
5 TABLET ORAL PRN
Status: DISCONTINUED | OUTPATIENT
Start: 2024-09-10 | End: 2024-09-10 | Stop reason: HOSPADM

## 2024-09-10 RX ORDER — SODIUM CHLORIDE 9 MG/ML
INJECTION, SOLUTION INTRAMUSCULAR; INTRAVENOUS; SUBCUTANEOUS PRN
Status: DISCONTINUED | OUTPATIENT
Start: 2024-09-10 | End: 2024-09-10 | Stop reason: SDUPTHER

## 2024-09-10 RX ORDER — SODIUM CHLORIDE 0.9 % (FLUSH) 0.9 %
5-40 SYRINGE (ML) INJECTION PRN
Status: DISCONTINUED | OUTPATIENT
Start: 2024-09-10 | End: 2024-09-10 | Stop reason: HOSPADM

## 2024-09-10 RX ORDER — ROSUVASTATIN CALCIUM 40 MG/1
40 TABLET, COATED ORAL EVERY EVENING
Status: DISCONTINUED | OUTPATIENT
Start: 2024-09-10 | End: 2024-09-11 | Stop reason: HOSPADM

## 2024-09-10 RX ORDER — HYDRALAZINE HYDROCHLORIDE 20 MG/ML
10 INJECTION INTRAMUSCULAR; INTRAVENOUS
Status: DISCONTINUED | OUTPATIENT
Start: 2024-09-10 | End: 2024-09-10 | Stop reason: HOSPADM

## 2024-09-10 RX ORDER — MONTELUKAST SODIUM 10 MG/1
10 TABLET ORAL NIGHTLY
Status: DISCONTINUED | OUTPATIENT
Start: 2024-09-10 | End: 2024-09-11 | Stop reason: HOSPADM

## 2024-09-10 RX ORDER — TRANEXAMIC ACID 650 MG/1
1950 TABLET ORAL ONCE
Status: COMPLETED | OUTPATIENT
Start: 2024-09-10 | End: 2024-09-10

## 2024-09-10 RX ORDER — MORPHINE SULFATE 2 MG/ML
2 INJECTION, SOLUTION INTRAMUSCULAR; INTRAVENOUS
Status: DISCONTINUED | OUTPATIENT
Start: 2024-09-10 | End: 2024-09-11 | Stop reason: HOSPADM

## 2024-09-10 RX ORDER — BUPIVACAINE/KETOROLAC/KETAMINE 150-60/50
SYRINGE (ML) INJECTION
Status: DISPENSED
Start: 2024-09-10 | End: 2024-09-10

## 2024-09-10 RX ORDER — CALCIUM CARBONATE 500 MG/1
500 TABLET, CHEWABLE ORAL DAILY
Status: DISCONTINUED | OUTPATIENT
Start: 2024-09-10 | End: 2024-09-11 | Stop reason: HOSPADM

## 2024-09-10 RX ORDER — ONDANSETRON 2 MG/ML
4 INJECTION INTRAMUSCULAR; INTRAVENOUS
Status: DISCONTINUED | OUTPATIENT
Start: 2024-09-10 | End: 2024-09-10 | Stop reason: HOSPADM

## 2024-09-10 RX ORDER — FAMOTIDINE 20 MG/1
20 TABLET, FILM COATED ORAL DAILY
Status: DISCONTINUED | OUTPATIENT
Start: 2024-09-11 | End: 2024-09-11 | Stop reason: HOSPADM

## 2024-09-10 RX ORDER — SENNA AND DOCUSATE SODIUM 50; 8.6 MG/1; MG/1
1 TABLET, FILM COATED ORAL 2 TIMES DAILY
Status: DISCONTINUED | OUTPATIENT
Start: 2024-09-10 | End: 2024-09-11 | Stop reason: HOSPADM

## 2024-09-10 RX ORDER — FENTANYL CITRATE 50 UG/ML
INJECTION, SOLUTION INTRAMUSCULAR; INTRAVENOUS PRN
Status: DISCONTINUED | OUTPATIENT
Start: 2024-09-10 | End: 2024-09-10 | Stop reason: SDUPTHER

## 2024-09-10 RX ORDER — LOSARTAN POTASSIUM 50 MG/1
50 TABLET ORAL DAILY
COMMUNITY

## 2024-09-10 RX ORDER — GABAPENTIN 400 MG/1
400 CAPSULE ORAL EVERY EVENING
Status: DISCONTINUED | OUTPATIENT
Start: 2024-09-10 | End: 2024-09-11 | Stop reason: HOSPADM

## 2024-09-10 RX ORDER — ALBUTEROL SULFATE 90 UG/1
2 AEROSOL, METERED RESPIRATORY (INHALATION) EVERY 6 HOURS PRN
Status: DISCONTINUED | OUTPATIENT
Start: 2024-09-10 | End: 2024-09-11 | Stop reason: HOSPADM

## 2024-09-10 RX ORDER — ASPIRIN 325 MG
325 TABLET, DELAYED RELEASE (ENTERIC COATED) ORAL DAILY
Status: DISCONTINUED | OUTPATIENT
Start: 2024-09-11 | End: 2024-09-11 | Stop reason: HOSPADM

## 2024-09-10 RX ORDER — PHENOL 1.4 %
1 AEROSOL, SPRAY (ML) MUCOUS MEMBRANE DAILY
COMMUNITY

## 2024-09-10 RX ORDER — OXYCODONE HYDROCHLORIDE 5 MG/1
5 TABLET ORAL EVERY 4 HOURS PRN
Status: DISCONTINUED | OUTPATIENT
Start: 2024-09-10 | End: 2024-09-11 | Stop reason: HOSPADM

## 2024-09-10 RX ORDER — PROPOFOL 10 MG/ML
INJECTION, EMULSION INTRAVENOUS PRN
Status: DISCONTINUED | OUTPATIENT
Start: 2024-09-10 | End: 2024-09-10 | Stop reason: SDUPTHER

## 2024-09-10 RX ORDER — OXYCODONE HYDROCHLORIDE 5 MG/1
10 TABLET ORAL PRN
Status: DISCONTINUED | OUTPATIENT
Start: 2024-09-10 | End: 2024-09-10 | Stop reason: HOSPADM

## 2024-09-10 RX ORDER — BUPROPION HYDROCHLORIDE 150 MG/1
150 TABLET, EXTENDED RELEASE ORAL
Status: DISCONTINUED | OUTPATIENT
Start: 2024-09-10 | End: 2024-09-11 | Stop reason: HOSPADM

## 2024-09-10 RX ORDER — MORPHINE SULFATE 4 MG/ML
4 INJECTION, SOLUTION INTRAMUSCULAR; INTRAVENOUS
Status: DISCONTINUED | OUTPATIENT
Start: 2024-09-10 | End: 2024-09-11 | Stop reason: HOSPADM

## 2024-09-10 RX ORDER — SODIUM CHLORIDE, SODIUM LACTATE, POTASSIUM CHLORIDE, CALCIUM CHLORIDE 600; 310; 30; 20 MG/100ML; MG/100ML; MG/100ML; MG/100ML
INJECTION, SOLUTION INTRAVENOUS CONTINUOUS
Status: DISCONTINUED | OUTPATIENT
Start: 2024-09-10 | End: 2024-09-10 | Stop reason: HOSPADM

## 2024-09-10 RX ORDER — ONDANSETRON 4 MG/1
4 TABLET, ORALLY DISINTEGRATING ORAL EVERY 8 HOURS PRN
Status: DISCONTINUED | OUTPATIENT
Start: 2024-09-10 | End: 2024-09-11 | Stop reason: HOSPADM

## 2024-09-10 RX ORDER — BUPIVACAINE HYDROCHLORIDE 7.5 MG/ML
INJECTION, SOLUTION INTRASPINAL
Status: COMPLETED | OUTPATIENT
Start: 2024-09-10 | End: 2024-09-10

## 2024-09-10 RX ORDER — ACETAMINOPHEN 325 MG/1
650 TABLET ORAL EVERY 6 HOURS
Status: DISCONTINUED | OUTPATIENT
Start: 2024-09-10 | End: 2024-09-11 | Stop reason: HOSPADM

## 2024-09-10 RX ORDER — BUPIVACAINE/KETOROLAC/KETAMINE 150-60/50
SYRINGE (ML) INJECTION PRN
Status: DISCONTINUED | OUTPATIENT
Start: 2024-09-10 | End: 2024-09-10 | Stop reason: ALTCHOICE

## 2024-09-10 RX ORDER — M-VIT,TX,IRON,MINS/CALC/FOLIC 27MG-0.4MG
1 TABLET ORAL DAILY
Status: DISCONTINUED | OUTPATIENT
Start: 2024-09-10 | End: 2024-09-11 | Stop reason: HOSPADM

## 2024-09-10 RX ORDER — SODIUM CHLORIDE 0.9 % (FLUSH) 0.9 %
5-40 SYRINGE (ML) INJECTION EVERY 12 HOURS SCHEDULED
Status: DISCONTINUED | OUTPATIENT
Start: 2024-09-10 | End: 2024-09-10 | Stop reason: HOSPADM

## 2024-09-10 RX ORDER — FAMOTIDINE 20 MG/1
20 TABLET, FILM COATED ORAL 2 TIMES DAILY
Status: DISCONTINUED | OUTPATIENT
Start: 2024-09-10 | End: 2024-09-10 | Stop reason: DRUGHIGH

## 2024-09-10 RX ORDER — ACETAMINOPHEN 325 MG/1
650 TABLET ORAL ONCE
Status: COMPLETED | OUTPATIENT
Start: 2024-09-10 | End: 2024-09-10

## 2024-09-10 RX ADMIN — PHENYLEPHRINE HYDROCHLORIDE 100 MCG: 10 INJECTION INTRAVENOUS at 11:28

## 2024-09-10 RX ADMIN — FENTANYL CITRATE 50 MCG: 50 INJECTION INTRAMUSCULAR; INTRAVENOUS at 11:10

## 2024-09-10 RX ADMIN — Medication 2000 MG: at 19:00

## 2024-09-10 RX ADMIN — MIDAZOLAM 1 MG: 1 INJECTION INTRAMUSCULAR; INTRAVENOUS at 11:10

## 2024-09-10 RX ADMIN — PROPOFOL 50 MG: 10 INJECTION, EMULSION INTRAVENOUS at 11:12

## 2024-09-10 RX ADMIN — FENTANYL CITRATE 50 MCG: 50 INJECTION INTRAMUSCULAR; INTRAVENOUS at 10:07

## 2024-09-10 RX ADMIN — MIDAZOLAM 1 MG: 1 INJECTION INTRAMUSCULAR; INTRAVENOUS at 10:07

## 2024-09-10 RX ADMIN — ROPIVACAINE HYDROCHLORIDE 15 ML: 5 INJECTION EPIDURAL; INFILTRATION; PERINEURAL at 10:12

## 2024-09-10 RX ADMIN — ACETAMINOPHEN 650 MG: 325 TABLET ORAL at 20:07

## 2024-09-10 RX ADMIN — DEXAMETHASONE SODIUM PHOSPHATE 10 MG: 10 INJECTION INTRAMUSCULAR; INTRAVENOUS at 10:12

## 2024-09-10 RX ADMIN — SODIUM CHLORIDE, POTASSIUM CHLORIDE, SODIUM LACTATE AND CALCIUM CHLORIDE: 600; 310; 30; 20 INJECTION, SOLUTION INTRAVENOUS at 10:57

## 2024-09-10 RX ADMIN — TRANEXAMIC ACID 1950 MG: 650 TABLET ORAL at 09:21

## 2024-09-10 RX ADMIN — Medication 2000 MG: at 10:56

## 2024-09-10 RX ADMIN — PROPOFOL 30 MCG/KG/MIN: 10 INJECTION, EMULSION INTRAVENOUS at 11:13

## 2024-09-10 RX ADMIN — DIMENHYDRINATE 50 MG: 50 TABLET ORAL at 09:19

## 2024-09-10 RX ADMIN — SODIUM CHLORIDE, POTASSIUM CHLORIDE, SODIUM LACTATE AND CALCIUM CHLORIDE: 600; 310; 30; 20 INJECTION, SOLUTION INTRAVENOUS at 09:29

## 2024-09-10 RX ADMIN — BUPROPION HYDROCHLORIDE 150 MG: 150 TABLET, FILM COATED, EXTENDED RELEASE ORAL at 15:24

## 2024-09-10 RX ADMIN — ANTACID TABLETS 500 MG: 500 TABLET, CHEWABLE ORAL at 15:25

## 2024-09-10 RX ADMIN — PHENYLEPHRINE HYDROCHLORIDE 100 MCG: 10 INJECTION INTRAVENOUS at 11:35

## 2024-09-10 RX ADMIN — Medication 1 TABLET: at 15:25

## 2024-09-10 RX ADMIN — DULOXETINE HYDROCHLORIDE 60 MG: 60 CAPSULE, DELAYED RELEASE ORAL at 15:24

## 2024-09-10 RX ADMIN — SODIUM CHLORIDE 15 ML: 9 INJECTION INTRAMUSCULAR; INTRAVENOUS; SUBCUTANEOUS at 10:12

## 2024-09-10 RX ADMIN — ACETAMINOPHEN 650 MG: 325 TABLET ORAL at 09:19

## 2024-09-10 RX ADMIN — MONTELUKAST 10 MG: 10 TABLET, FILM COATED ORAL at 20:07

## 2024-09-10 RX ADMIN — SODIUM CHLORIDE: 9 INJECTION, SOLUTION INTRAVENOUS at 23:52

## 2024-09-10 RX ADMIN — SODIUM CHLORIDE: 9 INJECTION, SOLUTION INTRAVENOUS at 14:50

## 2024-09-10 RX ADMIN — ACETAMINOPHEN 650 MG: 325 TABLET ORAL at 15:25

## 2024-09-10 RX ADMIN — PHENYLEPHRINE HYDROCHLORIDE 100 MCG: 10 INJECTION INTRAVENOUS at 11:18

## 2024-09-10 RX ADMIN — MELOXICAM 15 MG: 15 TABLET ORAL at 15:25

## 2024-09-10 RX ADMIN — PHENYLEPHRINE HYDROCHLORIDE 42 MCG/MIN: 10 INJECTION INTRAVENOUS at 11:38

## 2024-09-10 RX ADMIN — LOSARTAN POTASSIUM 50 MG: 50 TABLET, FILM COATED ORAL at 15:25

## 2024-09-10 RX ADMIN — GABAPENTIN 400 MG: 400 CAPSULE ORAL at 17:31

## 2024-09-10 RX ADMIN — DOCUSATE SODIUM 50 MG AND SENNOSIDES 8.6 MG 1 TABLET: 8.6; 5 TABLET, FILM COATED ORAL at 20:07

## 2024-09-10 RX ADMIN — BUPIVACAINE HYDROCHLORIDE IN DEXTROSE 12 MG: 7.5 INJECTION, SOLUTION SUBARACHNOID at 11:00

## 2024-09-10 RX ADMIN — ROSUVASTATIN 40 MG: 40 TABLET, FILM COATED ORAL at 17:31

## 2024-09-10 ASSESSMENT — PAIN DESCRIPTION - DESCRIPTORS: DESCRIPTORS: ACHING

## 2024-09-10 ASSESSMENT — PAIN - FUNCTIONAL ASSESSMENT
PAIN_FUNCTIONAL_ASSESSMENT: NONE - DENIES PAIN
PAIN_FUNCTIONAL_ASSESSMENT: ACTIVITIES ARE NOT PREVENTED
PAIN_FUNCTIONAL_ASSESSMENT: NONE - DENIES PAIN

## 2024-09-10 ASSESSMENT — PAIN DESCRIPTION - LOCATION: LOCATION: KNEE

## 2024-09-10 ASSESSMENT — PAIN DESCRIPTION - PAIN TYPE: TYPE: CHRONIC PAIN

## 2024-09-10 ASSESSMENT — PAIN DESCRIPTION - ONSET: ONSET: ON-GOING

## 2024-09-10 ASSESSMENT — PAIN SCALES - GENERAL
PAINLEVEL_OUTOF10: 0
PAINLEVEL_OUTOF10: 10
PAINLEVEL_OUTOF10: 0

## 2024-09-10 ASSESSMENT — PAIN DESCRIPTION - ORIENTATION: ORIENTATION: LEFT

## 2024-09-10 ASSESSMENT — PAIN DESCRIPTION - FREQUENCY: FREQUENCY: CONTINUOUS

## 2024-09-11 VITALS
HEART RATE: 88 BPM | BODY MASS INDEX: 27.25 KG/M2 | OXYGEN SATURATION: 98 % | TEMPERATURE: 97.6 F | RESPIRATION RATE: 16 BRPM | SYSTOLIC BLOOD PRESSURE: 174 MMHG | HEIGHT: 62 IN | WEIGHT: 148.1 LBS | DIASTOLIC BLOOD PRESSURE: 69 MMHG

## 2024-09-11 LAB — GLUCOSE BLD-MCNC: 145 MG/DL (ref 74–100)

## 2024-09-11 PROCEDURE — G0378 HOSPITAL OBSERVATION PER HR: HCPCS

## 2024-09-11 PROCEDURE — 97110 THERAPEUTIC EXERCISES: CPT

## 2024-09-11 PROCEDURE — 97116 GAIT TRAINING THERAPY: CPT

## 2024-09-11 PROCEDURE — 97535 SELF CARE MNGMENT TRAINING: CPT

## 2024-09-11 PROCEDURE — 6370000000 HC RX 637 (ALT 250 FOR IP): Performed by: ORTHOPAEDIC SURGERY

## 2024-09-11 PROCEDURE — 96360 HYDRATION IV INFUSION INIT: CPT

## 2024-09-11 PROCEDURE — 6360000002 HC RX W HCPCS: Performed by: ORTHOPAEDIC SURGERY

## 2024-09-11 PROCEDURE — 82947 ASSAY GLUCOSE BLOOD QUANT: CPT

## 2024-09-11 PROCEDURE — 96361 HYDRATE IV INFUSION ADD-ON: CPT

## 2024-09-11 PROCEDURE — 94761 N-INVAS EAR/PLS OXIMETRY MLT: CPT

## 2024-09-11 RX ADMIN — FAMOTIDINE 20 MG: 20 TABLET, FILM COATED ORAL at 08:22

## 2024-09-11 RX ADMIN — Medication 1 TABLET: at 08:22

## 2024-09-11 RX ADMIN — ACETAMINOPHEN 650 MG: 325 TABLET ORAL at 03:03

## 2024-09-11 RX ADMIN — DOCUSATE SODIUM 50 MG AND SENNOSIDES 8.6 MG 1 TABLET: 8.6; 5 TABLET, FILM COATED ORAL at 08:22

## 2024-09-11 RX ADMIN — ANTACID TABLETS 500 MG: 500 TABLET, CHEWABLE ORAL at 08:22

## 2024-09-11 RX ADMIN — LOSARTAN POTASSIUM 50 MG: 50 TABLET, FILM COATED ORAL at 08:22

## 2024-09-11 RX ADMIN — ACETAMINOPHEN 650 MG: 325 TABLET ORAL at 08:22

## 2024-09-11 RX ADMIN — ASPIRIN 325 MG: 325 TABLET, COATED ORAL at 08:22

## 2024-09-11 RX ADMIN — BUPROPION HYDROCHLORIDE 150 MG: 150 TABLET, FILM COATED, EXTENDED RELEASE ORAL at 08:22

## 2024-09-11 RX ADMIN — Medication 2000 MG: at 02:55

## 2024-09-11 RX ADMIN — METFORMIN HYDROCHLORIDE 500 MG: 500 TABLET ORAL at 08:22

## 2024-09-11 RX ADMIN — AMLODIPINE BESYLATE 5 MG: 5 TABLET ORAL at 08:22

## 2024-09-11 RX ADMIN — MELOXICAM 15 MG: 15 TABLET ORAL at 08:22

## 2024-09-11 RX ADMIN — FLUOXETINE HYDROCHLORIDE 20 MG: 20 CAPSULE ORAL at 08:22

## 2024-09-11 RX ADMIN — DULOXETINE HYDROCHLORIDE 60 MG: 60 CAPSULE, DELAYED RELEASE ORAL at 08:22

## 2024-09-11 ASSESSMENT — PAIN DESCRIPTION - ORIENTATION
ORIENTATION: LEFT
ORIENTATION: LEFT

## 2024-09-11 ASSESSMENT — PAIN SCALES - GENERAL
PAINLEVEL_OUTOF10: 3
PAINLEVEL_OUTOF10: 2
PAINLEVEL_OUTOF10: 0

## 2024-09-11 ASSESSMENT — PAIN DESCRIPTION - FREQUENCY: FREQUENCY: INTERMITTENT

## 2024-09-11 ASSESSMENT — PAIN - FUNCTIONAL ASSESSMENT: PAIN_FUNCTIONAL_ASSESSMENT: ACTIVITIES ARE NOT PREVENTED

## 2024-09-11 ASSESSMENT — PAIN DESCRIPTION - LOCATION
LOCATION: KNEE
LOCATION: KNEE

## 2024-09-11 ASSESSMENT — PAIN DESCRIPTION - DESCRIPTORS
DESCRIPTORS: ACHING
DESCRIPTORS: ACHING

## 2024-09-11 ASSESSMENT — PAIN DESCRIPTION - PAIN TYPE: TYPE: SURGICAL PAIN

## 2024-09-11 ASSESSMENT — PAIN DESCRIPTION - ONSET: ONSET: GRADUAL

## 2024-09-13 ENCOUNTER — HOSPITAL ENCOUNTER (EMERGENCY)
Age: 79
Discharge: HOME | End: 2024-09-13
Attending: EMERGENCY MEDICINE
Payer: MEDICARE

## 2024-09-13 VITALS
SYSTOLIC BLOOD PRESSURE: 148 MMHG | RESPIRATION RATE: 18 BRPM | OXYGEN SATURATION: 99 % | HEART RATE: 66 BPM | DIASTOLIC BLOOD PRESSURE: 68 MMHG | TEMPERATURE: 97 F

## 2024-09-13 DIAGNOSIS — Z48.89 ENCOUNTER FOR POST SURGICAL WOUND CHECK: Primary | ICD-10-CM

## 2024-09-13 PROCEDURE — 99282 EMERGENCY DEPT VISIT SF MDM: CPT

## 2024-09-13 ASSESSMENT — PAIN SCALES - GENERAL: PAINLEVEL_OUTOF10: 10

## 2024-09-13 ASSESSMENT — PAIN DESCRIPTION - LOCATION: LOCATION: KNEE

## 2024-09-13 ASSESSMENT — PAIN DESCRIPTION - ORIENTATION: ORIENTATION: LEFT

## 2024-09-13 ASSESSMENT — PAIN - FUNCTIONAL ASSESSMENT: PAIN_FUNCTIONAL_ASSESSMENT: 0-10

## 2024-11-30 ENCOUNTER — HOSPITAL ENCOUNTER (EMERGENCY)
Age: 79
Discharge: HOME | End: 2024-11-30
Payer: MEDICARE

## 2024-11-30 VITALS
SYSTOLIC BLOOD PRESSURE: 153 MMHG | OXYGEN SATURATION: 92 % | DIASTOLIC BLOOD PRESSURE: 96 MMHG | TEMPERATURE: 98.1 F | RESPIRATION RATE: 16 BRPM | HEART RATE: 85 BPM

## 2024-11-30 DIAGNOSIS — E83.42 HYPOMAGNESEMIA: ICD-10-CM

## 2024-11-30 DIAGNOSIS — R04.0 EPISTAXIS: Primary | ICD-10-CM

## 2024-11-30 LAB
ALBUMIN SERPL-MCNC: 3.6 G/DL (ref 3.5–5.2)
ALBUMIN/GLOB SERPL: 1.2 {RATIO} (ref 1–2.5)
ALP SERPL-CCNC: 134 U/L (ref 35–104)
ALT SERPL-CCNC: 13 U/L (ref 10–35)
ANION GAP SERPL CALCULATED.3IONS-SCNC: 10 MMOL/L (ref 9–16)
AST SERPL-CCNC: 27 U/L (ref 10–35)
BASOPHILS # BLD: 0.06 K/UL (ref 0–0.2)
BASOPHILS NFR BLD: 1 % (ref 0–2)
BILIRUB SERPL-MCNC: <0.2 MG/DL (ref 0–1.2)
BUN SERPL-MCNC: 14 MG/DL (ref 8–23)
BUN/CREAT SERPL: 16 (ref 9–20)
CALCIUM SERPL-MCNC: 9 MG/DL (ref 8.6–10.4)
CHLORIDE SERPL-SCNC: 101 MMOL/L (ref 98–107)
CO2 SERPL-SCNC: 26 MMOL/L (ref 20–31)
CREAT SERPL-MCNC: 0.9 MG/DL (ref 0.5–0.9)
EOSINOPHIL # BLD: 0.5 K/UL (ref 0–0.44)
EOSINOPHILS RELATIVE PERCENT: 6 % (ref 1–4)
ERYTHROCYTE [DISTWIDTH] IN BLOOD BY AUTOMATED COUNT: 13.7 % (ref 11.8–14.4)
GFR, ESTIMATED: 65 ML/MIN/1.73M2
GLUCOSE SERPL-MCNC: 113 MG/DL (ref 74–99)
HCT VFR BLD AUTO: 34.5 % (ref 36.3–47.1)
HGB BLD-MCNC: 11 G/DL (ref 11.9–15.1)
IMM GRANULOCYTES # BLD AUTO: <0.03 K/UL (ref 0–0.3)
IMM GRANULOCYTES NFR BLD: 0 %
INR PPP: 1.1
LYMPHOCYTES NFR BLD: 1.94 K/UL (ref 1.1–3.7)
LYMPHOCYTES RELATIVE PERCENT: 24 % (ref 24–43)
MAGNESIUM SERPL-MCNC: 1.5 MG/DL (ref 1.6–2.4)
MCH RBC QN AUTO: 27.2 PG (ref 25.2–33.5)
MCHC RBC AUTO-ENTMCNC: 31.9 G/DL (ref 28.4–34.8)
MCV RBC AUTO: 85.4 FL (ref 82.6–102.9)
MONOCYTES NFR BLD: 0.93 K/UL (ref 0.1–1.2)
MONOCYTES NFR BLD: 11 % (ref 3–12)
NEUTROPHILS NFR BLD: 58 % (ref 36–65)
NEUTS SEG NFR BLD: 4.81 K/UL (ref 1.5–8.1)
NRBC BLD-RTO: 0 PER 100 WBC
PARTIAL THROMBOPLASTIN TIME: 31.4 SEC (ref 26.8–34.8)
PLATELET # BLD AUTO: 387 K/UL (ref 138–453)
PMV BLD AUTO: 9.9 FL (ref 8.1–13.5)
POTASSIUM SERPL-SCNC: 4.2 MMOL/L (ref 3.7–5.3)
PROT SERPL-MCNC: 6.5 G/DL (ref 6.6–8.7)
PROTHROMBIN TIME: 14.1 SEC (ref 11.7–14.1)
RBC # BLD AUTO: 4.04 M/UL (ref 3.95–5.11)
SODIUM SERPL-SCNC: 137 MMOL/L (ref 136–145)
WBC OTHER # BLD: 8.3 K/UL (ref 3.5–11.3)

## 2024-11-30 PROCEDURE — 85730 THROMBOPLASTIN TIME PARTIAL: CPT

## 2024-11-30 PROCEDURE — 2500000003 HC RX 250 WO HCPCS: Performed by: NURSE PRACTITIONER

## 2024-11-30 PROCEDURE — 6370000000 HC RX 637 (ALT 250 FOR IP)

## 2024-11-30 PROCEDURE — 85025 COMPLETE CBC W/AUTO DIFF WBC: CPT

## 2024-11-30 PROCEDURE — 99283 EMERGENCY DEPT VISIT LOW MDM: CPT

## 2024-11-30 PROCEDURE — 85610 PROTHROMBIN TIME: CPT

## 2024-11-30 PROCEDURE — 83735 ASSAY OF MAGNESIUM: CPT

## 2024-11-30 PROCEDURE — 80053 COMPREHEN METABOLIC PANEL: CPT

## 2024-11-30 PROCEDURE — 6370000000 HC RX 637 (ALT 250 FOR IP): Performed by: NURSE PRACTITIONER

## 2024-11-30 RX ORDER — TRANEXAMIC ACID 100 MG/ML
100 INJECTION, SOLUTION INTRAVENOUS ONCE
Status: COMPLETED | OUTPATIENT
Start: 2024-11-30 | End: 2024-11-30

## 2024-11-30 RX ORDER — OXYMETAZOLINE HYDROCHLORIDE 0.05 G/100ML
2 SPRAY NASAL ONCE
Status: COMPLETED | OUTPATIENT
Start: 2024-11-30 | End: 2024-11-30

## 2024-11-30 RX ORDER — ONDANSETRON 4 MG/1
4 TABLET, ORALLY DISINTEGRATING ORAL 3 TIMES DAILY PRN
Qty: 21 TABLET | Refills: 0 | Status: SHIPPED | OUTPATIENT
Start: 2024-11-30

## 2024-11-30 RX ORDER — LOSARTAN POTASSIUM 25 MG/1
25 TABLET ORAL 2 TIMES DAILY
COMMUNITY

## 2024-11-30 RX ORDER — CEPHALEXIN 500 MG/1
500 CAPSULE ORAL 3 TIMES DAILY
Qty: 21 CAPSULE | Refills: 0 | Status: SHIPPED | OUTPATIENT
Start: 2024-11-30 | End: 2024-12-07

## 2024-11-30 RX ORDER — ONDANSETRON 4 MG/1
4 TABLET, ORALLY DISINTEGRATING ORAL ONCE
Status: COMPLETED | OUTPATIENT
Start: 2024-11-30 | End: 2024-11-30

## 2024-11-30 RX ORDER — LANOLIN ALCOHOL/MO/W.PET/CERES
400 CREAM (GRAM) TOPICAL ONCE
Status: COMPLETED | OUTPATIENT
Start: 2024-11-30 | End: 2024-11-30

## 2024-11-30 RX ADMIN — TRANEXAMIC ACID 100 MG: 100 INJECTION, SOLUTION INTRAVENOUS at 17:01

## 2024-11-30 RX ADMIN — OXYMETAZOLINE HYDROCHLORIDE 2 SPRAY: 0.5 SPRAY NASAL at 16:06

## 2024-11-30 RX ADMIN — ONDANSETRON 4 MG: 4 TABLET, ORALLY DISINTEGRATING ORAL at 17:15

## 2024-11-30 RX ADMIN — Medication 400 MG: at 17:52

## 2024-11-30 ASSESSMENT — PAIN - FUNCTIONAL ASSESSMENT: PAIN_FUNCTIONAL_ASSESSMENT: NONE - DENIES PAIN

## 2024-11-30 NOTE — ED PROVIDER NOTES
[facility] ED  EMERGENCY DEPARTMENT ENCOUNTER      Pt Name: Jahaira Maravilla  MRN: [id number]  Birthdate 1945  Date of evaluation: 11/30/2024  Provider: SACHI Kaur - CNP  6:02 PM    CHIEF COMPLAINT       Chief Complaint   Patient presents with    Epistaxis     45 mins prior to arrival pts nose bleeding, pt is hypertensive and took her BP meds, pt is not on blood thinners          HISTORY OF PRESENT ILLNESS        Jahaira Maravilla 79-year-old female presents from home to ED with complaint of nosebleed.45 mins prior to arrival pts nose bleeding, pt is hypertensive and took her BP meds. Pt does take baby aspirin daily.Pt denies trauma,nasal congestion symptoms.Pt reports that she has had nose bleeds in past.     PMH: CAD, diabetes mellitus, GERD, hyperlipidemia, hypertension, primary osteoarthritis of left knee.    The history is provided by the patient. No  was used.       Nursing Notes were reviewed.    REVIEW OF SYSTEMS       Review of Systems   HENT:  Positive for nosebleeds.         Right nares   All other systems reviewed and are negative.      Except as noted above the remainder of the review of systems was reviewed and negative.       PAST MEDICAL HISTORY     Past Medical History:   Diagnosis Date    CAD (coronary artery disease)     Diabetes mellitus (HCC)     GERD (gastroesophageal reflux disease)     Hyperlipidemia     Hypertension     Primary osteoarthritis of left knee 09/17/2019         SURGICAL HISTORY       Past Surgical History:   Procedure Laterality Date    APPENDECTOMY      CARDIAC CATHETERIZATION      no stents    CARPAL TUNNEL RELEASE Bilateral 10/1999    CATARACT REMOVAL WITH IMPLANT Right 11/2/15    CHOLECYSTECTOMY      HYSTERECTOMY (CERVIX STATUS UNKNOWN)      TOTAL KNEE ARTHROPLASTY Right 09/17/2019    Dr. Todd    TOTAL KNEE ARTHROPLASTY Right 9/17/2019    KNEE TOTAL ARTHROPLASTY performed by Justin Todd MD at [facility] OR    TOTAL KNEE  normal weight. She is not ill-appearing, toxic-appearing or diaphoretic.      Comments: 79-year-old female with sudden onset right nares nosebleed 45 minutes prior to arrival.  No trauma.   HENT:      Head: Normocephalic and atraumatic.      Right Ear: External ear normal.      Left Ear: External ear normal.      Nose: No congestion or rhinorrhea.      Comments: Left nares patent.  Right nares with proximal bleeding.  Nose clamp applied.     Mouth/Throat:      Mouth: Mucous membranes are moist.      Pharynx: No oropharyngeal exudate or posterior oropharyngeal erythema.      Comments: Airway patent.  Uvula midline.  No unilateral fullness.  Able to handle secretions.  She does have posterior pharyngeal blood clots and bleeding.  No angioedema or stridor.  Eyes:      General:         Right eye: No discharge.         Left eye: No discharge.      Conjunctiva/sclera: Conjunctivae normal.   Cardiovascular:      Rate and Rhythm: Normal rate and regular rhythm.      Pulses: Normal pulses.      Heart sounds: Normal heart sounds. No murmur heard.     No gallop.      Comments: S1-S2 no murmur gallop  Pulmonary:      Effort: Pulmonary effort is normal. No respiratory distress.      Breath sounds: No stridor. No wheezing, rhonchi or rales.      Comments: Symmetrical chest wall expansion.  No wheeze rales or rhonchi.  She has mildly diminished posterior bases  Chest:      Chest wall: No tenderness.   Abdominal:      General: Abdomen is flat. Bowel sounds are normal. There is no distension.      Palpations: There is no mass.      Tenderness: There is no abdominal tenderness.   Musculoskeletal:         General: No swelling, tenderness or signs of injury.      Cervical back: Normal range of motion and neck supple. No rigidity or tenderness.      Right lower leg: No edema.      Left lower leg: No edema.   Skin:     General: Skin is warm and dry.      Capillary Refill: Capillary refill takes less than 2 seconds.      Coloration: Skin

## 2024-11-30 NOTE — DISCHARGE INSTRUCTIONS
Increase magnesium rich foods-ie dark leafy greens, nuts, avocado  Continue home medications except hold baby aspirin x 5 days  Cepalexin 500 mg 1 by mouth, take 3 times daily x 7 days.  Zofran odt 4 mg 1 as needed every 8 hours as needed for nausea/vomiting.   Rhino rocket x 3 days or when removed per ENT.

## (undated) DEVICE — SUTURE VCRL SZ 1 L36IN ABSRB UD L36MM CT-1 1/2 CIR J947H

## (undated) DEVICE — 3M™ IOBAN™ 2 ANTIMICROBIAL INCISE DRAPE 6648EZ: Brand: IOBAN™ 2

## (undated) DEVICE — 3000CC GUARDIAN II: Brand: GUARDIAN

## (undated) DEVICE — PEN: MARKING STD 100/CS: Brand: MEDICAL ACTION INDUSTRIES

## (undated) DEVICE — DUAL CUT SAGITTAL BLADE

## (undated) DEVICE — SOLUTION IV IRRIG POUR BRL 0.9% SODIUM CHL 2F7124

## (undated) DEVICE — Z DISCONTINUED PER MEDLINE USE 2741943 DRESSING AQUACEL 10 IN ALG W9XL25CM SIL CVR WTRPRF VIR BACT BARR ANTIMIC

## (undated) DEVICE — UNDERGLOVE SURG SZ 8 BLU LTX FREE SYN POLYISOPRENE POLYMER

## (undated) DEVICE — 450 ML BOTTLE OF 0.05% CHLORHEXIDINE GLUCONATE IN 99.95% STERILE WATER FOR IRRIGATION, USP AND APPLICATOR.: Brand: IRRISEPT ANTIMICROBIAL WOUND LAVAGE

## (undated) DEVICE — SOLUTION IV 100ML 0.9% SOD CHL PLAS CONT USP VIAFLX 1 PER

## (undated) DEVICE — Device

## (undated) DEVICE — SYRINGE, LUER LOCK, 10ML: Brand: MEDLINE

## (undated) DEVICE — CUFF TOURNIQUET 34 SNG BLADDER DUAL PORT

## (undated) DEVICE — SOLUTION IV IRRIG 0.9% NACL 3000ML BAG 2B7477

## (undated) DEVICE — VISIONAIRE ADAPTIVE GUIDE KIT                                    LEGION PRIMARY: Brand: VISIONAIRE

## (undated) DEVICE — BANDAGE COMPR W6INXL12FT SMOOTH FOR LIMB EXSANG ESMARCH

## (undated) DEVICE — DECANTER FLD 9IN ST BG FOR ASEP TRNSF OF FLD

## (undated) DEVICE — GLOVE ORANGE PI 7 1/2   MSG9075

## (undated) DEVICE — COVER,TABLE,HEAVY DUTY,77"X90",STRL: Brand: MEDLINE

## (undated) DEVICE — SUTURE STRATAFIX SPRL SZ 1 L14IN ABSRB VLT L48CM CTX 1/2 SXPD2B405

## (undated) DEVICE — CEMENT MIXING SYSTEM WITH FEMORAL BREAKWAY NOZZLE: Brand: REVOLUTION

## (undated) DEVICE — STERILE PATIENT PROTECTIVE PAD FOR IMP® KNEE POSITIONERS & COHESIVE WRAP (10 / CASE): Brand: DE MAYO KNEE POSITIONER®

## (undated) DEVICE — 3M™ STERI-DRAPE™ U-DRAPE 1015: Brand: STERI-DRAPE™

## (undated) DEVICE — SUTURE VCRL SZ 2-0 L36IN ABSRB UD L40MM CT 1/2 CIR J957H

## (undated) DEVICE — FAN SPRAY KIT: Brand: PULSAVAC®

## (undated) DEVICE — NEEDLE SPNL 20GA L3.5IN YEL HUB S STL REG WALL FIT STYL W/

## (undated) DEVICE — STAPLER EXT SKIN 35 WIDE S STL STPL SQUEEZE HNDL VISISTAT

## (undated) DEVICE — ZIMMER® STERILE DISPOSABLE TOURNIQUET CUFF WITH PROTECTIVE SLEEVE AND PLC, SINGLE PORT, SINGLE BLADDER, 34 IN. (86 CM)

## (undated) DEVICE — T4 ZIPPER TOGA, (L/XL)

## (undated) DEVICE — STAPLER SKIN H3.9MM WIRE DIA0.58MM CRWN 6.9MM 35 STPL FIX